# Patient Record
Sex: MALE | Race: WHITE | NOT HISPANIC OR LATINO
[De-identification: names, ages, dates, MRNs, and addresses within clinical notes are randomized per-mention and may not be internally consistent; named-entity substitution may affect disease eponyms.]

---

## 2017-02-16 ENCOUNTER — APPOINTMENT (OUTPATIENT)
Dept: PULMONOLOGY | Facility: CLINIC | Age: 72
End: 2017-02-16

## 2017-02-16 VITALS — BODY MASS INDEX: 29.85 KG/M2 | WEIGHT: 208 LBS

## 2017-02-16 VITALS — BODY MASS INDEX: 29.85 KG/M2 | SYSTOLIC BLOOD PRESSURE: 120 MMHG | DIASTOLIC BLOOD PRESSURE: 70 MMHG | HEIGHT: 70 IN

## 2017-02-16 VITALS — OXYGEN SATURATION: 88 %

## 2017-02-16 DIAGNOSIS — R09.02 HYPOXEMIA: ICD-10-CM

## 2017-02-16 DIAGNOSIS — J43.9 EMPHYSEMA, UNSPECIFIED: ICD-10-CM

## 2017-02-16 DIAGNOSIS — R06.09 OTHER FORMS OF DYSPNEA: ICD-10-CM

## 2017-03-15 ENCOUNTER — INPATIENT (INPATIENT)
Facility: HOSPITAL | Age: 72
LOS: 7 days | DRG: 870 | End: 2017-03-23
Attending: EMERGENCY MEDICINE | Admitting: FAMILY MEDICINE
Payer: COMMERCIAL

## 2017-03-15 VITALS
RESPIRATION RATE: 26 BRPM | HEART RATE: 75 BPM | TEMPERATURE: 101 F | SYSTOLIC BLOOD PRESSURE: 152 MMHG | DIASTOLIC BLOOD PRESSURE: 87 MMHG | WEIGHT: 220.02 LBS | HEIGHT: 71 IN | OXYGEN SATURATION: 98 %

## 2017-03-15 DIAGNOSIS — J44.1 CHRONIC OBSTRUCTIVE PULMONARY DISEASE WITH (ACUTE) EXACERBATION: ICD-10-CM

## 2017-03-15 DIAGNOSIS — N40.0 BENIGN PROSTATIC HYPERPLASIA WITHOUT LOWER URINARY TRACT SYMPTOMS: ICD-10-CM

## 2017-03-15 DIAGNOSIS — K21.9 GASTRO-ESOPHAGEAL REFLUX DISEASE WITHOUT ESOPHAGITIS: ICD-10-CM

## 2017-03-15 DIAGNOSIS — J18.9 PNEUMONIA, UNSPECIFIED ORGANISM: ICD-10-CM

## 2017-03-15 DIAGNOSIS — I10 ESSENTIAL (PRIMARY) HYPERTENSION: ICD-10-CM

## 2017-03-15 DIAGNOSIS — J44.9 CHRONIC OBSTRUCTIVE PULMONARY DISEASE, UNSPECIFIED: ICD-10-CM

## 2017-03-15 LAB
ALBUMIN SERPL ELPH-MCNC: 4.4 G/DL — SIGNIFICANT CHANGE UP (ref 3.3–5.2)
ALP SERPL-CCNC: 66 U/L — SIGNIFICANT CHANGE UP (ref 40–120)
ALT FLD-CCNC: 35 U/L — SIGNIFICANT CHANGE UP
ANION GAP SERPL CALC-SCNC: 12 MMOL/L — SIGNIFICANT CHANGE UP (ref 5–17)
AST SERPL-CCNC: 34 U/L — SIGNIFICANT CHANGE UP
BASE EXCESS BLDA CALC-SCNC: 5.2 MMOL/L — HIGH (ref -3–3)
BASOPHILS # BLD AUTO: 0 K/UL — SIGNIFICANT CHANGE UP (ref 0–0.2)
BASOPHILS NFR BLD AUTO: 0.2 % — SIGNIFICANT CHANGE UP (ref 0–2)
BILIRUB SERPL-MCNC: 0.3 MG/DL — LOW (ref 0.4–2)
BLOOD GAS COMMENTS ARTERIAL: SIGNIFICANT CHANGE UP
BUN SERPL-MCNC: 18 MG/DL — SIGNIFICANT CHANGE UP (ref 8–20)
CALCIUM SERPL-MCNC: 9.2 MG/DL — SIGNIFICANT CHANGE UP (ref 8.6–10.2)
CHLORIDE SERPL-SCNC: 89 MMOL/L — LOW (ref 98–107)
CO2 SERPL-SCNC: 35 MMOL/L — HIGH (ref 22–29)
CREAT SERPL-MCNC: 0.73 MG/DL — SIGNIFICANT CHANGE UP (ref 0.5–1.3)
D DIMER BLD IA.RAPID-MCNC: <150 NG/ML DDU — SIGNIFICANT CHANGE UP
EOSINOPHIL # BLD AUTO: 0 K/UL — SIGNIFICANT CHANGE UP (ref 0–0.5)
EOSINOPHIL NFR BLD AUTO: 0.4 % — SIGNIFICANT CHANGE UP (ref 0–5)
GAS PNL BLDA: SIGNIFICANT CHANGE UP
GLUCOSE SERPL-MCNC: 123 MG/DL — HIGH (ref 70–115)
HCO3 BLDA-SCNC: 29 MMOL/L — HIGH (ref 20–26)
HCT VFR BLD CALC: 46.2 % — SIGNIFICANT CHANGE UP (ref 42–52)
HGB BLD-MCNC: 15 G/DL — SIGNIFICANT CHANGE UP (ref 14–18)
HMPV RNA SPEC QL NAA+PROBE: DETECTED
HOROWITZ INDEX BLDA+IHG-RTO: 50 — SIGNIFICANT CHANGE UP
LYMPHOCYTES # BLD AUTO: 1.2 K/UL — SIGNIFICANT CHANGE UP (ref 1–4.8)
LYMPHOCYTES # BLD AUTO: 13.5 % — LOW (ref 20–55)
MCHC RBC-ENTMCNC: 32.5 G/DL — SIGNIFICANT CHANGE UP (ref 32–36)
MCHC RBC-ENTMCNC: 32.8 PG — HIGH (ref 27–31)
MCV RBC AUTO: 100.9 FL — HIGH (ref 80–94)
MONOCYTES # BLD AUTO: 0.5 K/UL — SIGNIFICANT CHANGE UP (ref 0–0.8)
MONOCYTES NFR BLD AUTO: 6.2 % — SIGNIFICANT CHANGE UP (ref 3–10)
NEUTROPHILS # BLD AUTO: 6.6 K/UL — SIGNIFICANT CHANGE UP (ref 1.8–8)
NEUTROPHILS NFR BLD AUTO: 78.3 % — HIGH (ref 37–73)
NT-PROBNP SERPL-SCNC: 128 PG/ML — SIGNIFICANT CHANGE UP (ref 0–300)
PCO2 BLDA: 62 MMHG — HIGH (ref 35–45)
PH BLDA: 7.34 — LOW (ref 7.35–7.45)
PLATELET # BLD AUTO: 185 K/UL — SIGNIFICANT CHANGE UP (ref 150–400)
PO2 BLDA: 134 MMHG — HIGH (ref 83–108)
POTASSIUM SERPL-MCNC: 4.2 MMOL/L — SIGNIFICANT CHANGE UP (ref 3.5–5.3)
POTASSIUM SERPL-SCNC: 4.2 MMOL/L — SIGNIFICANT CHANGE UP (ref 3.5–5.3)
PROT SERPL-MCNC: 7.9 G/DL — SIGNIFICANT CHANGE UP (ref 6.6–8.7)
RAPID RVP RESULT: DETECTED
RBC # BLD: 4.58 M/UL — LOW (ref 4.6–6.2)
RBC # FLD: 13.4 % — SIGNIFICANT CHANGE UP (ref 11–15.6)
SAO2 % BLDA: 99 % — SIGNIFICANT CHANGE UP (ref 95–99)
SODIUM SERPL-SCNC: 136 MMOL/L — SIGNIFICANT CHANGE UP (ref 135–145)
WBC # BLD: 8.5 K/UL — SIGNIFICANT CHANGE UP (ref 4.8–10.8)
WBC # FLD AUTO: 8.5 K/UL — SIGNIFICANT CHANGE UP (ref 4.8–10.8)

## 2017-03-15 PROCEDURE — 99221 1ST HOSP IP/OBS SF/LOW 40: CPT

## 2017-03-15 PROCEDURE — 99291 CRITICAL CARE FIRST HOUR: CPT

## 2017-03-15 PROCEDURE — 93010 ELECTROCARDIOGRAM REPORT: CPT

## 2017-03-15 PROCEDURE — 99292 CRITICAL CARE ADDL 30 MIN: CPT

## 2017-03-15 RX ORDER — TAMSULOSIN HYDROCHLORIDE 0.4 MG/1
0.4 CAPSULE ORAL AT BEDTIME
Qty: 0 | Refills: 0 | Status: DISCONTINUED | OUTPATIENT
Start: 2017-03-15 | End: 2017-03-23

## 2017-03-15 RX ORDER — BUDESONIDE AND FORMOTEROL FUMARATE DIHYDRATE 160; 4.5 UG/1; UG/1
1 AEROSOL RESPIRATORY (INHALATION)
Qty: 0 | Refills: 0 | Status: DISCONTINUED | OUTPATIENT
Start: 2017-03-15 | End: 2017-03-15

## 2017-03-15 RX ORDER — VALSARTAN 80 MG/1
160 TABLET ORAL DAILY
Qty: 0 | Refills: 0 | Status: DISCONTINUED | OUTPATIENT
Start: 2017-03-15 | End: 2017-03-16

## 2017-03-15 RX ORDER — MAGNESIUM SULFATE 500 MG/ML
1 VIAL (ML) INJECTION ONCE
Qty: 0 | Refills: 0 | Status: COMPLETED | OUTPATIENT
Start: 2017-03-15 | End: 2017-03-15

## 2017-03-15 RX ORDER — IPRATROPIUM/ALBUTEROL SULFATE 18-103MCG
3 AEROSOL WITH ADAPTER (GRAM) INHALATION EVERY 6 HOURS
Qty: 0 | Refills: 0 | Status: DISCONTINUED | OUTPATIENT
Start: 2017-03-15 | End: 2017-03-15

## 2017-03-15 RX ORDER — SODIUM CHLORIDE 9 MG/ML
3 INJECTION INTRAMUSCULAR; INTRAVENOUS; SUBCUTANEOUS ONCE
Qty: 0 | Refills: 0 | Status: COMPLETED | OUTPATIENT
Start: 2017-03-15 | End: 2017-03-15

## 2017-03-15 RX ORDER — ALBUTEROL 90 UG/1
2.5 AEROSOL, METERED ORAL EVERY 4 HOURS
Qty: 0 | Refills: 0 | Status: DISCONTINUED | OUTPATIENT
Start: 2017-03-15 | End: 2017-03-23

## 2017-03-15 RX ORDER — ALBUTEROL 90 UG/1
1 AEROSOL, METERED ORAL EVERY 4 HOURS
Qty: 0 | Refills: 0 | Status: DISCONTINUED | OUTPATIENT
Start: 2017-03-15 | End: 2017-03-15

## 2017-03-15 RX ORDER — FINASTERIDE 5 MG/1
5 TABLET, FILM COATED ORAL DAILY
Qty: 0 | Refills: 0 | Status: DISCONTINUED | OUTPATIENT
Start: 2017-03-15 | End: 2017-03-16

## 2017-03-15 RX ORDER — IPRATROPIUM/ALBUTEROL SULFATE 18-103MCG
3 AEROSOL WITH ADAPTER (GRAM) INHALATION EVERY 4 HOURS
Qty: 0 | Refills: 0 | Status: DISCONTINUED | OUTPATIENT
Start: 2017-03-15 | End: 2017-03-23

## 2017-03-15 RX ORDER — ACETAMINOPHEN 500 MG
1000 TABLET ORAL ONCE
Qty: 0 | Refills: 0 | Status: COMPLETED | OUTPATIENT
Start: 2017-03-15 | End: 2017-03-15

## 2017-03-15 RX ORDER — METOPROLOL TARTRATE 50 MG
100 TABLET ORAL DAILY
Qty: 0 | Refills: 0 | Status: DISCONTINUED | OUTPATIENT
Start: 2017-03-15 | End: 2017-03-16

## 2017-03-15 RX ORDER — PANTOPRAZOLE SODIUM 20 MG/1
40 TABLET, DELAYED RELEASE ORAL
Qty: 0 | Refills: 0 | Status: DISCONTINUED | OUTPATIENT
Start: 2017-03-15 | End: 2017-03-16

## 2017-03-15 RX ORDER — TIOTROPIUM BROMIDE 18 UG/1
1 CAPSULE ORAL; RESPIRATORY (INHALATION) DAILY
Qty: 0 | Refills: 0 | Status: DISCONTINUED | OUTPATIENT
Start: 2017-03-15 | End: 2017-03-15

## 2017-03-15 RX ORDER — ENOXAPARIN SODIUM 100 MG/ML
40 INJECTION SUBCUTANEOUS EVERY 24 HOURS
Qty: 0 | Refills: 0 | Status: DISCONTINUED | OUTPATIENT
Start: 2017-03-15 | End: 2017-03-23

## 2017-03-15 RX ORDER — PIPERACILLIN AND TAZOBACTAM 4; .5 G/20ML; G/20ML
3.38 INJECTION, POWDER, LYOPHILIZED, FOR SOLUTION INTRAVENOUS ONCE
Qty: 0 | Refills: 0 | Status: COMPLETED | OUTPATIENT
Start: 2017-03-15 | End: 2017-03-15

## 2017-03-15 RX ADMIN — SODIUM CHLORIDE 3 MILLILITER(S): 9 INJECTION INTRAMUSCULAR; INTRAVENOUS; SUBCUTANEOUS at 14:20

## 2017-03-15 RX ADMIN — Medication 100 GRAM(S): at 15:16

## 2017-03-15 RX ADMIN — PIPERACILLIN AND TAZOBACTAM 200 GRAM(S): 4; .5 INJECTION, POWDER, LYOPHILIZED, FOR SOLUTION INTRAVENOUS at 15:16

## 2017-03-15 RX ADMIN — Medication 1000 MILLIGRAM(S): at 15:30

## 2017-03-15 RX ADMIN — ENOXAPARIN SODIUM 40 MILLIGRAM(S): 100 INJECTION SUBCUTANEOUS at 22:23

## 2017-03-15 RX ADMIN — TAMSULOSIN HYDROCHLORIDE 0.4 MILLIGRAM(S): 0.4 CAPSULE ORAL at 22:23

## 2017-03-15 RX ADMIN — Medication 400 MILLIGRAM(S): at 15:16

## 2017-03-15 RX ADMIN — Medication 40 MILLIGRAM(S): at 22:22

## 2017-03-15 RX ADMIN — Medication 3 MILLILITER(S): at 20:00

## 2017-03-15 RX ADMIN — Medication 3 MILLILITER(S): at 14:00

## 2017-03-15 RX ADMIN — Medication 125 MILLIGRAM(S): at 14:20

## 2017-03-15 NOTE — ED ADULT NURSE REASSESSMENT NOTE - NS ED NURSE REASSESS COMMENT FT1
Spoke to MD Dominguez. Ok to trail pt off BiPap. Pt AOx3, resp even unlabored, no complaints at this time. will continue to monitor

## 2017-03-15 NOTE — ED PROVIDER NOTE - PMH
Anxiety disorder    BPH (Benign Prostatic Hyperplasia)    COPD (chronic obstructive pulmonary disease)    Gastric reflux    Gastric ulcer    HTN (hypertension)

## 2017-03-15 NOTE — ED ADULT NURSE NOTE - OBJECTIVE STATEMENT
72y/o male c/o diff breathing. Pt AOx3, resp labored, bilateral wheezing, use of accessory muscles noted. Pt denies chest pain, numbness or tingling. Pt cough present upon arrival, febrile, sinus tach 145HR. Dr. Ruiz at bedside, respiratory called to bedside. Pt placed on BiPap, will continue to monitor

## 2017-03-15 NOTE — H&P ADULT - NSHPPHYSICALEXAM_GEN_ALL_CORE
PHYSICAL EXAM:  Vital Signs Last 24 Hrs  T(C): 38.1, Max: 38.1 (03-15 @ 13:38)  T(F): 100.5, Max: 100.5 (03-15 @ 13:38)  HR: 117 (75 - 145)  BP: 140/70 (140/70 - 152/87)  BP(mean): --  RR: 26 (26 - 26)  SpO2: 96% (96% - 100%)  GENERAL: NAD, well-groomed, well-developed  HEAD:  Atraumatic, Normocephalic  EYES: EOMI, PERRLA, conjunctiva and sclera clear  ENMT: No tonsillar erythema, exudates, or enlargement; Moist mucous membranes, Good dentition, No lesions  NECK: Supple, No JVD, Normal thyroid  NERVOUS SYSTEM:  Alert & Oriented X3, Good concentration; Motor Strength 5/5 B/L upper and lower extremities; DTRs 2+ intact and symmetric  CHEST/LUNG:  rales + b/l lower lungs, fair air entry b/l   HEART: Regular rate and rhythm; No murmurs, rubs, or gallops  ABDOMEN: Soft, Nontender, Nondistended; Bowel sounds present  EXTREMITIES:  2+ Peripheral Pulses, No clubbing, cyanosis, or edema  LYMPH: No lymphadenopathy noted  SKIN: No rashes or lesions

## 2017-03-15 NOTE — ED ADULT NURSE REASSESSMENT NOTE - NS ED NURSE REASSESS COMMENT FT1
Pt AOx3, resp even, unlabored. Tolerating Bipap well. Pt O2Sat 98%. Pt remains sinus tach 132HR.  right sided arm infiltrate noted, MD Lombardi aware. will continue to monitor.

## 2017-03-15 NOTE — ED PROVIDER NOTE - CRITICAL CARE PROVIDED
documentation/consultation with other physicians/interpretation of diagnostic studies/additional history taking/direct patient care (not related to procedure)

## 2017-03-15 NOTE — H&P ADULT - ASSESSMENT
71 years male with PMH of HTN,BPH, COPD on home oxygen 4 Liters presented to ED with difficulty breathing, cough and fever.

## 2017-03-15 NOTE — ED ADULT NURSE REASSESSMENT NOTE - NS ED NURSE REASSESS COMMENT FT1
Pt tolerated 4LNC for 30mins, Pt became labored, O2Sat 98%, appeared uncomfortable. MD Lombardi made aware, Pt return to BiPap. care continued by night RN.

## 2017-03-15 NOTE — H&P ADULT - PROBLEM SELECTOR PLAN 4
Pt is on BIPAP 40%- O2 sat 96%, plan to wean on BIPAP, C/W methyprednisone and inhalors, Called Dr [facility],

## 2017-03-15 NOTE — ED ADULT NURSE REASSESSMENT NOTE - NS ED NURSE REASSESS COMMENT FT1
Assumed patient care from LISANDRO Huitron at 2330,. charting as noted. Patient received in B15R, lying in bed. A&ox4, able to make needs known. On Bipap, denies pain but states discomfort in breathing. Lung fields diminished upon auscultation, repositioned for comfot on  Left ac iv site without redness or swelling, Assumed patient care from LISANDRO Huitron at 2330,. charting as noted. Patient received in B15R, lying in bed. A&ox4, able to make needs known. On Bipap, denies pain but states discomfort in breathing, utilizing accessory muscles. Lung fields diminished upon auscultation, repositioned for comfort. Left ac iv site without redness or swelling, Patient not in distress.

## 2017-03-15 NOTE — ED PROVIDER NOTE - CARE PLAN
Principal Discharge DX:	COPD exacerbation  Secondary Diagnosis:	Pneumonia  Secondary Diagnosis:	HTN (hypertension)

## 2017-03-15 NOTE — H&P ADULT - HISTORY OF PRESENT ILLNESS
71 years male with PMH of HTN,BPH, COPD on home oxygen 4 Liters presented to ED with difficulty breathing, cough and fever. Pt denies chest pain, back pain, N/V/D.  Pt denies any abdominal pain, urinary symptoms, back pain headaches or any other symptoms.

## 2017-03-15 NOTE — CONSULT NOTE ADULT - SUBJECTIVE AND OBJECTIVE BOX
PULMONARY CONSULT NOTE      LELA JOSHUA-[id number]    Patient is a 71y old  Male who presents with a chief complaint of severe dyspnea and progressive edema  Severe NIALL on Symbicort and Tudorza.  Not 02 requiring.  NIALL suspect    HISTORY OF PRESENT ILLNESS:    MEDICATIONS  (STANDING):  ALBUTerol/ipratropium for Nebulization 3milliLiter(s) Nebulizer every 6 hours  ALBUTerol    90 MICROgram(s) HFA Inhaler 1Puff(s) Inhalation every 4 hours  tiotropium 18 MICROgram(s) Capsule 1Capsule(s) Inhalation daily  finasteride 5milliGRAM(s) Oral daily  hydrochlorothiazide    Capsule 12.5milliGRAM(s) Oral daily  valsartan 160milliGRAM(s) Oral daily  metoprolol 100milliGRAM(s) Oral daily  pantoprazole    Tablet 40milliGRAM(s) Oral before breakfast  buDESOnide 160 MICROgram(s)/formoterol 4.5 MICROgram(s) Inhaler 1Puff(s) Inhalation two times a day  tamsulosin 0.4milliGRAM(s) Oral at bedtime  levoFLOXacin IVPB 750milliGRAM(s) IV Intermittent every 24 hours  enoxaparin Injectable 40milliGRAM(s) SubCutaneous every 24 hours  methylPREDNISolone sodium succinate Injectable 40milliGRAM(s) IV Push every 8 hours      MEDICATIONS  (PRN):      Allergies    No Known Allergies    Intolerances        PAST MEDICAL & SURGICAL HISTORY:  Gastric ulcer  BPH (Benign Prostatic Hyperplasia)  Gastric reflux  COPD (chronic obstructive pulmonary disease)  Anxiety disorder  HTN (hypertension)  S/P colonoscopy      FAMILY HISTORY:  No pertinent family history in first degree relatives      SOCIAL HISTORY  Smoking History: Former smoker    REVIEW OF SYSTEMS:    CONSTITUTIONAL:  No fevers, chills, sweats    HEENT:  Eyes:  No diplopia or blurred vision. ENT:  No earache, sore throat or runny nose.    CARDIOVASCULAR:  No pressure, squeezing, tightness, or heaviness about the chest; no palpitations.    RESPIRATORY:  Severe dyspnea    GASTROINTESTINAL:  No abdominal pain, nausea, vomiting or diarrhea.    GENITOURINARY:  No dysuria, frequency or urgency.    NEUROLOGIC:  No paresthesias, fasciculations, seizures or weakness.    PSYCHIATRIC:  No disorder of thought or mood.    Vital Signs Last 24 Hrs  T(C): 38.1, Max: 38.1 (03-15 @ 13:38)  T(F): 100.5, Max: 100.5 (03-15 @ 13:38)  HR: 140 (75 - 145)  BP: 140/70 (140/70 - 152/87)  BP(mean): --  RR: 26 (26 - 26)  SpO2: 99% (98% - 100%)    PHYSICAL EXAMINATION:    GENERAL: The patient is a well-developed, well-nourished _____in no apparent distress.     HEENT: Head is normocephalic and atraumatic. Extraocular muscles are intact. Mucous membranes are moist.     NECK: Supple.     LUNGS: Diminished to auscultation without wheezing, rales, or rhonchi. Respirations unlabored    HEART: Regular rate and rhythm without murmur.    ABDOMEN: Soft, nontender, and nondistended.  No hepatosplenomegaly is noted.    EXTREMITIES: Without any cyanosis, clubbing, rash, lesions.  Pitting leg edema.    NEUROLOGIC: Grossly intact.      LABS:                        15.0   8.5   )-----------( 185      ( 15 Mar 2017 14:00 )             46.2     15 Mar 2017 14:00    136    |  89     |  18.0   ----------------------------<  123    4.2     |  35.0   |  0.73     Ca    9.2        15 Mar 2017 14:00    TPro  7.9    /  Alb  4.4    /  TBili  0.3    /  DBili  x      /  AST  34     /  ALT  35     /  AlkPhos  66     15 Mar 2017 14:00          CARDIAC MARKERS ( 15 Mar 2017 14:00 )  x     / <0.01 ng/mL / x     / x     / x          D-Dimer Assay, Quantitative: <150 ng/mL DDU (03-15 @ 13:59)    Serum Pro-Brain Natriuretic Peptide: 128 pg/mL (03-15 @ 13:59)          MICROBIOLOGY:    RADIOLOGY & ADDITIONAL STUDIES:      EXAM:  CHEST 2 VIEWS PA AND LAT      PROCEDURE DATE:  Mar 24 2016     INTERPRETATION:  Clinical information: COPD and recent pneumonia.    Findings: PA and lateral views of the chest dated 3/24/2016 are compared   to chest radiograph dated 5/31/2013 and CT chest dated 5/20/2014.   Hyperinflation and bullous emphysematous changes are again noted. There   is no pulmonary consolidation or pleural effusion. Cardiac, mediastinal   and hilar structures are within normal limits. Visualized osseous   structures demonstrate no abnormality.    IMPRESSION: No consolidation.    JENNIFER RYDER M.D., ATTENDING RADIOLOGIST  This document has been electronically signed. Mar 24 2016  4:40P PULMONARY CONSULT NOTE      LELA JOSHUA-[id number]    Patient is a 71y old  Male who presents with a chief complaint of severe dyspnea and progressive edema  Severe COPD on Symbicort and Tudorza.  Not 02 requiring.  NIALL suspect    HISTORY OF PRESENT ILLNESS:    MEDICATIONS  (STANDING):  ALBUTerol/ipratropium for Nebulization 3milliLiter(s) Nebulizer every 6 hours  ALBUTerol    90 MICROgram(s) HFA Inhaler 1Puff(s) Inhalation every 4 hours  tiotropium 18 MICROgram(s) Capsule 1Capsule(s) Inhalation daily  finasteride 5milliGRAM(s) Oral daily  hydrochlorothiazide    Capsule 12.5milliGRAM(s) Oral daily  valsartan 160milliGRAM(s) Oral daily  metoprolol 100milliGRAM(s) Oral daily  pantoprazole    Tablet 40milliGRAM(s) Oral before breakfast  buDESOnide 160 MICROgram(s)/formoterol 4.5 MICROgram(s) Inhaler 1Puff(s) Inhalation two times a day  tamsulosin 0.4milliGRAM(s) Oral at bedtime  levoFLOXacin IVPB 750milliGRAM(s) IV Intermittent every 24 hours  enoxaparin Injectable 40milliGRAM(s) SubCutaneous every 24 hours  methylPREDNISolone sodium succinate Injectable 40milliGRAM(s) IV Push every 8 hours      MEDICATIONS  (PRN):      Allergies    No Known Allergies    Intolerances        PAST MEDICAL & SURGICAL HISTORY:  Gastric ulcer  BPH (Benign Prostatic Hyperplasia)  Gastric reflux  COPD (chronic obstructive pulmonary disease)  Anxiety disorder  HTN (hypertension)  S/P colonoscopy      FAMILY HISTORY:  No pertinent family history in first degree relatives      SOCIAL HISTORY  Smoking History: Former smoker    REVIEW OF SYSTEMS:    CONSTITUTIONAL:  No fevers, chills, sweats    HEENT:  Eyes:  No diplopia or blurred vision. ENT:  No earache, sore throat or runny nose.    CARDIOVASCULAR:  No pressure, squeezing, tightness, or heaviness about the chest; no palpitations.    RESPIRATORY:  Severe dyspnea    GASTROINTESTINAL:  No abdominal pain, nausea, vomiting or diarrhea.    GENITOURINARY:  No dysuria, frequency or urgency.    NEUROLOGIC:  No paresthesias, fasciculations, seizures or weakness.    PSYCHIATRIC:  No disorder of thought or mood.    Vital Signs Last 24 Hrs  T(C): 38.1, Max: 38.1 (03-15 @ 13:38)  T(F): 100.5, Max: 100.5 (03-15 @ 13:38)  HR: 140 (75 - 145)  BP: 140/70 (140/70 - 152/87)  BP(mean): --  RR: 26 (26 - 26)  SpO2: 99% (98% - 100%)    PHYSICAL EXAMINATION:    GENERAL: The patient is a well-developed, well-nourished _____in no apparent distress.     HEENT: Head is normocephalic and atraumatic. Extraocular muscles are intact. Mucous membranes are moist.     NECK: Supple.     LUNGS: Diminished to auscultation without wheezing, rales, or rhonchi. Respirations unlabored    HEART: Regular rate and rhythm without murmur.    ABDOMEN: Soft, nontender, and nondistended.  No hepatosplenomegaly is noted.    EXTREMITIES: Without any cyanosis, clubbing, rash, lesions.  Pitting leg edema.    NEUROLOGIC: Grossly intact.      LABS:                        15.0   8.5   )-----------( 185      ( 15 Mar 2017 14:00 )             46.2     15 Mar 2017 14:00    136    |  89     |  18.0   ----------------------------<  123    4.2     |  35.0   |  0.73     Ca    9.2        15 Mar 2017 14:00    TPro  7.9    /  Alb  4.4    /  TBili  0.3    /  DBili  x      /  AST  34     /  ALT  35     /  AlkPhos  66     15 Mar 2017 14:00          CARDIAC MARKERS ( 15 Mar 2017 14:00 )  x     / <0.01 ng/mL / x     / x     / x          D-Dimer Assay, Quantitative: <150 ng/mL DDU (03-15 @ 13:59)    Serum Pro-Brain Natriuretic Peptide: 128 pg/mL (03-15 @ 13:59)          MICROBIOLOGY:    RADIOLOGY & ADDITIONAL STUDIES:      EXAM:  CHEST 2 VIEWS PA AND LAT      PROCEDURE DATE:  Mar 24 2016     INTERPRETATION:  Clinical information: COPD and recent pneumonia.    Findings: PA and lateral views of the chest dated 3/24/2016 are compared   to chest radiograph dated 5/31/2013 and CT chest dated 5/20/2014.   Hyperinflation and bullous emphysematous changes are again noted. There   is no pulmonary consolidation or pleural effusion. Cardiac, mediastinal   and hilar structures are within normal limits. Visualized osseous   structures demonstrate no abnormality.    IMPRESSION: No consolidation.    JENNIFER RYDER M.D., ATTENDING RADIOLOGIST  This document has been electronically signed. Mar 24 2016  4:40P

## 2017-03-15 NOTE — ED PROVIDER NOTE - OBJECTIVE STATEMENT
The patient is a 71 year old male presents to ED with SOB, cough and fever.  The patient has history of COPD and HTN.  No CP, NO abd pain, No recent surgery no recent trauma,no travel.

## 2017-03-16 DIAGNOSIS — R57.9 SHOCK, UNSPECIFIED: ICD-10-CM

## 2017-03-16 DIAGNOSIS — J12.3 HUMAN METAPNEUMOVIRUS PNEUMONIA: ICD-10-CM

## 2017-03-16 DIAGNOSIS — J44.1 CHRONIC OBSTRUCTIVE PULMONARY DISEASE WITH (ACUTE) EXACERBATION: ICD-10-CM

## 2017-03-16 DIAGNOSIS — J96.01 ACUTE RESPIRATORY FAILURE WITH HYPOXIA: ICD-10-CM

## 2017-03-16 LAB
ALBUMIN SERPL ELPH-MCNC: 3.9 G/DL — SIGNIFICANT CHANGE UP (ref 3.3–5.2)
ALP SERPL-CCNC: 60 U/L — SIGNIFICANT CHANGE UP (ref 40–120)
ALT FLD-CCNC: 36 U/L — SIGNIFICANT CHANGE UP
ANION GAP SERPL CALC-SCNC: 13 MMOL/L — SIGNIFICANT CHANGE UP (ref 5–17)
ANION GAP SERPL CALC-SCNC: 13 MMOL/L — SIGNIFICANT CHANGE UP (ref 5–17)
APPEARANCE UR: CLEAR — SIGNIFICANT CHANGE UP
AST SERPL-CCNC: 57 U/L — HIGH
BACTERIA # UR AUTO: ABNORMAL
BASE EXCESS BLDA CALC-SCNC: 4.4 MMOL/L — HIGH (ref -3–3)
BILIRUB SERPL-MCNC: 0.3 MG/DL — LOW (ref 0.4–2)
BILIRUB UR-MCNC: NEGATIVE — SIGNIFICANT CHANGE UP
BLOOD GAS COMMENTS ARTERIAL: SIGNIFICANT CHANGE UP
BUN SERPL-MCNC: 27 MG/DL — HIGH (ref 8–20)
BUN SERPL-MCNC: 40 MG/DL — HIGH (ref 8–20)
CALCIUM SERPL-MCNC: 8.1 MG/DL — LOW (ref 8.6–10.2)
CALCIUM SERPL-MCNC: 8.7 MG/DL — SIGNIFICANT CHANGE UP (ref 8.6–10.2)
CHLORIDE SERPL-SCNC: 91 MMOL/L — LOW (ref 98–107)
CHLORIDE SERPL-SCNC: 92 MMOL/L — LOW (ref 98–107)
CO2 SERPL-SCNC: 30 MMOL/L — HIGH (ref 22–29)
CO2 SERPL-SCNC: 33 MMOL/L — HIGH (ref 22–29)
COLOR SPEC: YELLOW — SIGNIFICANT CHANGE UP
CREAT SERPL-MCNC: 0.59 MG/DL — SIGNIFICANT CHANGE UP (ref 0.5–1.3)
CREAT SERPL-MCNC: 1 MG/DL — SIGNIFICANT CHANGE UP (ref 0.5–1.3)
DIFF PNL FLD: ABNORMAL
EPI CELLS # UR: SIGNIFICANT CHANGE UP
GAS PNL BLDA: SIGNIFICANT CHANGE UP
GLUCOSE SERPL-MCNC: 143 MG/DL — HIGH (ref 70–115)
GLUCOSE SERPL-MCNC: 193 MG/DL — HIGH (ref 70–115)
GLUCOSE UR QL: NEGATIVE MG/DL — SIGNIFICANT CHANGE UP
HCO3 BLDA-SCNC: 28 MMOL/L — HIGH (ref 20–26)
HCT VFR BLD CALC: 42.3 % — SIGNIFICANT CHANGE UP (ref 42–52)
HCT VFR BLD CALC: 42.5 % — SIGNIFICANT CHANGE UP (ref 42–52)
HGB BLD-MCNC: 13.5 G/DL — LOW (ref 14–18)
HGB BLD-MCNC: 13.9 G/DL — LOW (ref 14–18)
HOROWITZ INDEX BLDA+IHG-RTO: 35 — SIGNIFICANT CHANGE UP
HYALINE CASTS # UR AUTO: ABNORMAL /LPF
KETONES UR-MCNC: ABNORMAL
LACTATE BLDV-MCNC: 1.8 MMOL/L — SIGNIFICANT CHANGE UP (ref 0.7–2)
LEUKOCYTE ESTERASE UR-ACNC: NEGATIVE — SIGNIFICANT CHANGE UP
MAGNESIUM SERPL-MCNC: 2.4 MG/DL — SIGNIFICANT CHANGE UP (ref 1.8–2.5)
MAGNESIUM SERPL-MCNC: 2.5 MG/DL — SIGNIFICANT CHANGE UP (ref 1.8–2.5)
MCHC RBC-ENTMCNC: 31.9 G/DL — LOW (ref 32–36)
MCHC RBC-ENTMCNC: 32.4 PG — HIGH (ref 27–31)
MCHC RBC-ENTMCNC: 32.5 PG — HIGH (ref 27–31)
MCHC RBC-ENTMCNC: 32.7 G/DL — SIGNIFICANT CHANGE UP (ref 32–36)
MCV RBC AUTO: 101.9 FL — HIGH (ref 80–94)
MCV RBC AUTO: 99.1 FL — HIGH (ref 80–94)
NITRITE UR-MCNC: NEGATIVE — SIGNIFICANT CHANGE UP
PCO2 BLDA: 56 MMHG — HIGH (ref 35–45)
PH BLDA: 7.36 — SIGNIFICANT CHANGE UP (ref 7.35–7.45)
PH UR: 5 — SIGNIFICANT CHANGE UP (ref 4.8–8)
PHOSPHATE SERPL-MCNC: 3.7 MG/DL — SIGNIFICANT CHANGE UP (ref 2.4–4.7)
PHOSPHATE SERPL-MCNC: 3.9 MG/DL — SIGNIFICANT CHANGE UP (ref 2.4–4.7)
PLATELET # BLD AUTO: 194 K/UL — SIGNIFICANT CHANGE UP (ref 150–400)
PLATELET # BLD AUTO: 218 K/UL — SIGNIFICANT CHANGE UP (ref 150–400)
PO2 BLDA: 137 MMHG — HIGH (ref 83–108)
POTASSIUM SERPL-MCNC: 4.5 MMOL/L — SIGNIFICANT CHANGE UP (ref 3.5–5.3)
POTASSIUM SERPL-MCNC: 4.6 MMOL/L — SIGNIFICANT CHANGE UP (ref 3.5–5.3)
POTASSIUM SERPL-SCNC: 4.5 MMOL/L — SIGNIFICANT CHANGE UP (ref 3.5–5.3)
POTASSIUM SERPL-SCNC: 4.6 MMOL/L — SIGNIFICANT CHANGE UP (ref 3.5–5.3)
PROT SERPL-MCNC: 7.3 G/DL — SIGNIFICANT CHANGE UP (ref 6.6–8.7)
PROT UR-MCNC: 30 MG/DL
RBC # BLD: 4.15 M/UL — LOW (ref 4.6–6.2)
RBC # BLD: 4.29 M/UL — LOW (ref 4.6–6.2)
RBC # FLD: 13.3 % — SIGNIFICANT CHANGE UP (ref 11–15.6)
RBC # FLD: 13.3 % — SIGNIFICANT CHANGE UP (ref 11–15.6)
RBC CASTS # UR COMP ASSIST: ABNORMAL /HPF (ref 0–4)
SAO2 % BLDA: 99 % — SIGNIFICANT CHANGE UP (ref 95–99)
SODIUM SERPL-SCNC: 134 MMOL/L — LOW (ref 135–145)
SODIUM SERPL-SCNC: 138 MMOL/L — SIGNIFICANT CHANGE UP (ref 135–145)
SP GR SPEC: 1.03 — HIGH (ref 1.01–1.02)
UROBILINOGEN FLD QL: NEGATIVE MG/DL — SIGNIFICANT CHANGE UP
WBC # BLD: 19 K/UL — HIGH (ref 4.8–10.8)
WBC # BLD: 8.9 K/UL — SIGNIFICANT CHANGE UP (ref 4.8–10.8)
WBC # FLD AUTO: 19 K/UL — HIGH (ref 4.8–10.8)
WBC # FLD AUTO: 8.9 K/UL — SIGNIFICANT CHANGE UP (ref 4.8–10.8)
WBC UR QL: SIGNIFICANT CHANGE UP

## 2017-03-16 PROCEDURE — 71010: CPT | Mod: 26,77

## 2017-03-16 PROCEDURE — 71010: CPT | Mod: 26

## 2017-03-16 PROCEDURE — 99291 CRITICAL CARE FIRST HOUR: CPT

## 2017-03-16 RX ORDER — ALBUTEROL 90 UG/1
15 AEROSOL, METERED ORAL ONCE
Qty: 0 | Refills: 0 | Status: COMPLETED | OUTPATIENT
Start: 2017-03-16 | End: 2017-03-16

## 2017-03-16 RX ORDER — HYDROCORTISONE 20 MG
100 TABLET ORAL EVERY 6 HOURS
Qty: 0 | Refills: 0 | Status: DISCONTINUED | OUTPATIENT
Start: 2017-03-16 | End: 2017-03-17

## 2017-03-16 RX ORDER — PANTOPRAZOLE SODIUM 20 MG/1
40 TABLET, DELAYED RELEASE ORAL DAILY
Qty: 0 | Refills: 0 | Status: DISCONTINUED | OUTPATIENT
Start: 2017-03-16 | End: 2017-03-20

## 2017-03-16 RX ORDER — PHENYLEPHRINE HYDROCHLORIDE 10 MG/ML
0.4 INJECTION INTRAVENOUS
Qty: 80 | Refills: 0 | Status: DISCONTINUED | OUTPATIENT
Start: 2017-03-16 | End: 2017-03-16

## 2017-03-16 RX ORDER — ALPRAZOLAM 0.25 MG
0.25 TABLET ORAL EVERY 8 HOURS
Qty: 0 | Refills: 0 | Status: DISCONTINUED | OUTPATIENT
Start: 2017-03-16 | End: 2017-03-16

## 2017-03-16 RX ORDER — HYDROCORTISONE 20 MG
50 TABLET ORAL EVERY 6 HOURS
Qty: 0 | Refills: 0 | Status: DISCONTINUED | OUTPATIENT
Start: 2017-03-16 | End: 2017-03-16

## 2017-03-16 RX ORDER — ROCURONIUM BROMIDE 10 MG/ML
100 VIAL (ML) INTRAVENOUS ONCE
Qty: 0 | Refills: 0 | Status: COMPLETED | OUTPATIENT
Start: 2017-03-16 | End: 2017-03-16

## 2017-03-16 RX ORDER — NOREPINEPHRINE BITARTRATE/D5W 8 MG/250ML
0.02 PLASTIC BAG, INJECTION (ML) INTRAVENOUS
Qty: 8 | Refills: 0 | Status: DISCONTINUED | OUTPATIENT
Start: 2017-03-16 | End: 2017-03-18

## 2017-03-16 RX ORDER — CHLORHEXIDINE GLUCONATE 213 G/1000ML
15 SOLUTION TOPICAL
Qty: 0 | Refills: 0 | Status: DISCONTINUED | OUTPATIENT
Start: 2017-03-16 | End: 2017-03-23

## 2017-03-16 RX ORDER — ALPRAZOLAM 0.25 MG
0.25 TABLET ORAL ONCE
Qty: 0 | Refills: 0 | Status: DISCONTINUED | OUTPATIENT
Start: 2017-03-16 | End: 2017-03-16

## 2017-03-16 RX ORDER — SODIUM CHLORIDE 9 MG/ML
1000 INJECTION INTRAMUSCULAR; INTRAVENOUS; SUBCUTANEOUS ONCE
Qty: 0 | Refills: 0 | Status: COMPLETED | OUTPATIENT
Start: 2017-03-16 | End: 2017-03-16

## 2017-03-16 RX ORDER — KETAMINE HYDROCHLORIDE 100 MG/ML
150 INJECTION INTRAMUSCULAR; INTRAVENOUS ONCE
Qty: 0 | Refills: 0 | Status: DISCONTINUED | OUTPATIENT
Start: 2017-03-16 | End: 2017-03-16

## 2017-03-16 RX ADMIN — CHLORHEXIDINE GLUCONATE 15 MILLILITER(S): 213 SOLUTION TOPICAL at 18:41

## 2017-03-16 RX ADMIN — Medication 50 MILLIGRAM(S): at 17:48

## 2017-03-16 RX ADMIN — Medication 3 MILLILITER(S): at 00:18

## 2017-03-16 RX ADMIN — Medication 3.74 MICROGRAM(S)/KG/MIN: at 19:33

## 2017-03-16 RX ADMIN — VALSARTAN 160 MILLIGRAM(S): 80 TABLET ORAL at 05:27

## 2017-03-16 RX ADMIN — SODIUM CHLORIDE 2000 MILLILITER(S): 9 INJECTION INTRAMUSCULAR; INTRAVENOUS; SUBCUTANEOUS at 15:00

## 2017-03-16 RX ADMIN — Medication 100 MILLIGRAM(S): at 05:27

## 2017-03-16 RX ADMIN — Medication 3 MILLILITER(S): at 16:00

## 2017-03-16 RX ADMIN — ALBUTEROL 2.5 MILLIGRAM(S): 90 AEROSOL, METERED ORAL at 03:16

## 2017-03-16 RX ADMIN — Medication 40 MILLIGRAM(S): at 13:23

## 2017-03-16 RX ADMIN — Medication 40 MILLIGRAM(S): at 05:26

## 2017-03-16 RX ADMIN — ALBUTEROL 15 MILLIGRAM(S): 90 AEROSOL, METERED ORAL at 10:56

## 2017-03-16 RX ADMIN — Medication 3 MILLILITER(S): at 12:25

## 2017-03-16 RX ADMIN — Medication 3.74 MICROGRAM(S)/KG/MIN: at 16:16

## 2017-03-16 RX ADMIN — Medication 0.25 MILLIGRAM(S): at 04:30

## 2017-03-16 RX ADMIN — ENOXAPARIN SODIUM 40 MILLIGRAM(S): 100 INJECTION SUBCUTANEOUS at 23:41

## 2017-03-16 RX ADMIN — TAMSULOSIN HYDROCHLORIDE 0.4 MILLIGRAM(S): 0.4 CAPSULE ORAL at 23:46

## 2017-03-16 RX ADMIN — Medication 100 MILLIGRAM(S): at 23:45

## 2017-03-16 RX ADMIN — Medication 100 MILLIGRAM(S): at 14:56

## 2017-03-16 RX ADMIN — KETAMINE HYDROCHLORIDE 150 MILLIGRAM(S): 100 INJECTION INTRAMUSCULAR; INTRAVENOUS at 14:55

## 2017-03-16 RX ADMIN — Medication 3 MILLILITER(S): at 08:25

## 2017-03-16 RX ADMIN — ALBUTEROL 2.5 MILLIGRAM(S): 90 AEROSOL, METERED ORAL at 14:42

## 2017-03-16 NOTE — AIRWAY PLACEMENT NOTE ADULT - AIRWAY COMMENTS:
Patient's airway was easily intubated.  Breath sounds were markedly distant and with some moderate noncompliance appreciated on bagging.  Capnography was positive after second breath.  Patien't's blood pressure dropped precipitously; in re-evaluation, the ETT was confirmed to be in the correct position.  SpO2 90s --> blood pressure didn't recover, patient developed PEA for 2 minutes.  s/p 1mg epinephrine IVP; was started on bolus of crystalloid as well as phenylephrine already hanging pre-intubation (not infusing at that point, but was on hold just in case); patient's return of spontaneous circulation after 2-3 minutes with /110; titrated off norepinephrine and phenylephine infusions.  STAT echocardiogram was obtained which, in "wet read" did not reveal significant LV dysfunction or RV dysfunction.  HOWEVER -- patient's flow time curve on the ventilator, along with what we clinically observed, seems to indicate auto-PEEPing which may have precipitated the cardiovascular collapse.  Patient's hemodynamics have now required re-initiation of norepinephrine -- currently at 0.3 mcg/kg/min and downtrending; ETCO2 was as high as 80s (during CPR of note his ETCO2 was in the 20s-30s which may indicate no actual cardiac arrest but hypotension without pulses palpable) --ETCO2 now is in the 60s; starting a continuous albuterol treatment to try to decrease bronchospasm induced autoPEEPing.  OGT placed; will need CVC and arterial catheter placement.  Critical care time excluding procedures 50 minutes.

## 2017-03-16 NOTE — ED ADULT NURSE REASSESSMENT NOTE - NS ED NURSE REASSESS COMMENT FT1
spo2 93 on bipap on this time. pt remains diminished to all lung fields with minor improvement. breathing even and unlabored. at this time. pt still c/o of sob. dr. keene was at [facility] @ [phone]. awaiting abg results for possible upgrade or further intervention.

## 2017-03-16 NOTE — PROGRESS NOTE ADULT - SUBJECTIVE AND OBJECTIVE BOX
PULMONARY PROGRESS NOTE      LELA [id number]    Patient is a 71y old  Male who presents with a chief complaint of SOB    INTERVAL HPI/OVERNIGHT EVENTS:  Has remained on BiPAP.  Feeling better    MEDICATIONS  (STANDING):  finasteride 5milliGRAM(s) Oral daily  hydrochlorothiazide    Capsule 12.5milliGRAM(s) Oral daily  valsartan 160milliGRAM(s) Oral daily  metoprolol 100milliGRAM(s) Oral daily  pantoprazole    Tablet 40milliGRAM(s) Oral before breakfast  tamsulosin 0.4milliGRAM(s) Oral at bedtime  levoFLOXacin IVPB 750milliGRAM(s) IV Intermittent every 24 hours  enoxaparin Injectable 40milliGRAM(s) SubCutaneous every 24 hours  methylPREDNISolone sodium succinate Injectable 40milliGRAM(s) IV Push every 8 hours  ALBUTerol/ipratropium for Nebulization 3milliLiter(s) Nebulizer every 4 hours      MEDICATIONS  (PRN):  ALBUTerol    0.083% 2.5milliGRAM(s) Nebulizer every 4 hours PRN Shortness of Breath and/or Wheezing  ALPRAZolam 0.25milliGRAM(s) Oral every 8 hours PRN Anxiety      Allergies    No Known Allergies    Intolerances        PAST MEDICAL & SURGICAL HISTORY:  Gastric ulcer  BPH (Benign Prostatic Hyperplasia)  Gastric reflux  COPD (chronic obstructive pulmonary disease)  Anxiety disorder  HTN (hypertension)  S/P colonoscopy      SOCIAL HISTORY  Smoking History:       REVIEW OF SYSTEMS:    CONSTITUTIONAL:  No distress    HEENT:  Eyes:  No diplopia or blurred vision. ENT:  No earache, sore throat or runny nose.    CARDIOVASCULAR:  No pressure, squeezing, tightness, heaviness or aching about the chest; no palpitations.    RESPIRATORY:  per HPI    GASTROINTESTINAL:  No nausea, vomiting or diarrhea.    GENITOURINARY:  No dysuria, frequency or urgency.    MUSCULOSKELETAL:  No joint pain    SKIN:  No new lesions.    NEUROLOGIC:  No paresthesias, fasciculations, seizures or weakness.    PSYCHIATRIC:  No disorder of thought or mood.    ENDOCRINE:  No heat or cold intolerance, polyuria or polydipsia.    HEMATOLOGICAL:  No easy bruising or bleeding.     Vital Signs Last 24 Hrs  T(C): 37, Max: 38.1 (03-15 @ 13:38)  T(F): 98.6, Max: 100.5 (03-15 @ 13:38)  HR: 118 (75 - 145)  BP: 128/84 (89/62 - 152/92)  BP(mean): 89 (89 - 89)  RR: 20 (20 - 26)  SpO2: 92% (92% - 100%)    PHYSICAL EXAMINATION:    GENERAL: The patient is awake and alert in no apparent distress.     HEENT: Head is normocephalic and atraumatic. Extraocular muscles are intact. Mucous membranes are moist.    NECK: Supple.    LUNGS: diminished bs, scattered exp wheezes    HEART: Regular rate and rhythm without murmur.    ABDOMEN: Soft, nontender, and nondistended.      EXTREMITIES: Without any cyanosis, clubbing, rash, lesions or edema.    NEUROLOGIC: Grossly intact.    SKIN: No ulceration or induration present.      LABS:                        13.9   8.9   )-----------( 194      ( 16 Mar 2017 06:35 )             42.5     16 Mar 2017 06:37    138    |  92     |  27.0   ----------------------------<  143    4.5     |  33.0   |  0.59     Ca    8.7        16 Mar 2017 06:37  Phos  3.9       16 Mar 2017 06:37  Mg     2.4       16 Mar 2017 06:37    TPro  7.3    /  Alb  3.9    /  TBili  0.3    /  DBili  x      /  AST  57     /  ALT  36     /  AlkPhos  60     16 Mar 2017 06:37        ABG - ( 16 Mar 2017 03:36 )  pH: 7.36  /  pCO2: 56    /  pO2: 137   / HCO3: 28    / Base Excess: 4.4   /  SaO2: 99                CARDIAC MARKERS ( 15 Mar 2017 14:00 )  x     / <0.01 ng/mL / x     / x     / x          D-Dimer Assay, Quantitative: <150 ng/mL DDU (03-15 @ 13:59)    Serum Pro-Brain Natriuretic Peptide: 128 pg/mL (03-15 @ 13:59)          MICROBIOLOGY:Rapid Respiratory Viral Panel (03.15.17 @ 21:37)    Rapid RVP Result: Detected: The [employer] RVP Rapid uses polymerase chain reaction (PCR) and melt  curve analysis to screen for adenovirus; coronavirus HKU1, NL63, 229E,  OC43; human metapneumovirus (hMPV); human enterovirus/rhinovirus  (Entero/RV); influenza A; influenza A/H1;Detected: influenza A/H3; influenza  A/H1-2009; influenza B; parainfluenza viruses 1, 2, 3, 4; respiratory  syncytial virus; Bordetella pertussis; Mycoplasma pneumoniae; and  Chlamydophila pneumoniae.          RADIOLOGY & ADDITIONAL STUDIES:   EXAM:  CHEST SINGLE VIEW FRONTAL                          PROCEDURE DATE:  03/16/2017        INTERPRETATION:  Chest, single portable view    HISTORY: Dyspnea    Comparison: 1/30    IMPRESSION:    Support Devices: None  Heart:  Unremarkable  Mediastinum:  Unremarkable  Lungs/Airways: Hyperinflation likely reflecting emphysema. Reticular   nodular opacities right base, stable. No new findings.  Bones/Soft tissues: Unremarkable                                      MAUREEN WILKERSON M.D., ATTENDING RADIOLOGIST  This document has been electronically signed. Mar 16 2017  9:41AM

## 2017-03-16 NOTE — CONSULT NOTE ADULT - SUBJECTIVE AND OBJECTIVE BOX
Patient is a 71y old  Male who presents with a chief complaint of SOB    BRIEF HOSPITAL COURSE:     PAST MEDICAL & SURGICAL HISTORY: (per chart)  Gastric ulcer  BPH (Benign Prostatic Hyperplasia)  Gastric reflux  COPD (chronic obstructive pulmonary disease)  Anxiety disorder  HTN (hypertension)  S/P colonoscopy    Allergies    No Known Allergies    Intolerances      FAMILY HISTORY:  No pertinent family history in first degree relatives per chart      Review of Systems:  unable, respiratory failure      Medications:  levoFLOXacin IVPB 750milliGRAM(s) IV Intermittent every 24 hours    metoprolol 100milliGRAM(s) Oral daily  tamsulosin 0.4milliGRAM(s) Oral at bedtime  norepinephrine Infusion 0.02MICROgram(s)/kG/Min IV Continuous <Continuous>    ALBUTerol/ipratropium for Nebulization 3milliLiter(s) Nebulizer every 4 hours  ALBUTerol    0.083% 2.5milliGRAM(s) Nebulizer every 4 hours PRN  ALBUTerol    0.083%. 15milliGRAM(s) Nebulizer once    ALPRAZolam 0.25milliGRAM(s) Oral every 8 hours PRN  ketamine Injectable 150milliGRAM(s) IV Push once  rocuronium Injectable 100milliGRAM(s) IV Push once      enoxaparin Injectable 40milliGRAM(s) SubCutaneous every 24 hours    pantoprazole    Tablet 40milliGRAM(s) Oral before breakfast      finasteride 5milliGRAM(s) Oral daily  methylPREDNISolone sodium succinate Injectable 40milliGRAM(s) IV Push every 8 hours  hydrocortisone  Injectable 50milliGRAM(s) IV Push every 6 hours    sodium chloride 0.9% Bolus 1000milliLiter(s) IV Bolus once                ICU Vital Signs Last 24 Hrs  T(C): 36.3, Max: 37 (03-16 @ 02:55)  T(F): 97.4, Max: 98.6 (03-16 @ 02:55)  HR: 100 (100 - 144)  BP: 64/44 (60/42 - 152/92)  BP(mean): 49 (47 - 89)  ABP: --  ABP(mean): --  RR: 13 (13 - 38)  SpO2: 96% (90% - 98%)    Vital Signs Last 24 Hrs  T(C): 36.3, Max: 37 (03-16 @ 02:55)  T(F): 97.4, Max: 98.6 (03-16 @ 02:55)  HR: 100 (100 - 144)  BP: 64/44 (60/42 - 152/92)  BP(mean): 49 (47 - 89)  RR: 13 (13 - 38)  SpO2: 96% (90% - 98%)    ABG - ( 16 Mar 2017 03:36 )  pH: 7.36  /  pCO2: 56    /  pO2: 137   / HCO3: 28    / Base Excess: 4.4   /  SaO2: 99                  I&O's Detail        LABS:                        13.9   8.9   )-----------( 194      ( 16 Mar 2017 06:35 )             42.5     16 Mar 2017 06:37    138    |  92     |  27.0   ----------------------------<  143    4.5     |  33.0   |  0.59     Ca    8.7        16 Mar 2017 06:37  Phos  3.9       16 Mar 2017 06:37  Mg     2.4       16 Mar 2017 06:37    TPro  7.3    /  Alb  3.9    /  TBili  0.3    /  DBili  x      /  AST  57     /  ALT  36     /  AlkPhos  60     16 Mar 2017 06:37      CARDIAC MARKERS ( 15 Mar 2017 14:00 )  x     / <0.01 ng/mL / x     / x     / x                  CULTURES:      Physical Examination:    General: Severe respiratory distress, mottling, somnulent but arousable, using accessory muscles    HEENT: Pupils equal, reactive to light.  Symmetric.    PULM: Wheezing throughout without adequate air movement noted    CVS: tachycardic rate and rhythm, no murmurs, rubs, or gallops    ABD: Soft, distended, nontender, normoactive bowel sounds, no masses    EXT: 2+ edema    SKIN: mottled, cool extremities    NEURO: Somnulent but arousable, not able to communicate effectively but follows simple commands and appears nonfocal.    CRITICAL CARE TIME SPENT: 50

## 2017-03-16 NOTE — PROGRESS NOTE ADULT - ASSESSMENT
71 years male with PMH of HTN,BPH, COPD on home oxygen 4 Liters presented to ED with difficulty breathing, cough and fever. Found to be positive for Human metapneumovirus (hMPV) and in COPD exacerbation. He remains on BiPAP in some distress, tried to wean to VM, but patient did not tolerate. The plan was to continue steroids Duoneb wean BiPAP to VM. He was placed on contact precautions for Human metapneumovirus (hMPV). Patient decompensated in the afternoon requiring ICU consult, intubation, and admission to ICU for further management of care.

## 2017-03-16 NOTE — PROGRESS NOTE ADULT - ATTENDING COMMENTS
pt seen and examined with PA. has abd distention due to constipation and has been on BIPAP continuously since admission. with several failed attempts to wean off. sinus tachycardia due to work of breathing. calling ICU for eval. but meanwhile pt decompensated and was taken to ICU  seen by ICU and accepted.

## 2017-03-16 NOTE — AIRWAY PLACEMENT NOTE ADULT - POST AIRWAY PLACEMENT ASSESSMENT:
positive end tidal CO2 noted/chest excursion noted/breath sounds bilateral/CXR pending/very distant breath sounds

## 2017-03-16 NOTE — PROGRESS NOTE ADULT - SUBJECTIVE AND OBJECTIVE BOX
ZAC JOSHUA Patient is a 71y old  Male who presents with a chief complaint of    HPI:  71 years male with PMH of HTN,BPH, COPD on home oxygen 4 Liters presented to ED with difficulty breathing, cough and fever. Pt denies chest pain, back pain, N/V/D.  Pt denies any abdominal pain, urinary symptoms, back pain headaches or any other symptoms. (15 Mar 2017 19:34)    Interval History 3/16:  The patient was seen and examined earlier in the day before needing to be evaluated by the ICU. Patient was found with BiPAP on, in some respiratory distress. States he is SOB and having some difficulty breathing. He was attempted to be weaned of the BiPAP to venti mask and did not do well. States he has not had a bowel movement in 5 days. He denies fever, chills, nausea, vomiting, CP. Does states he is SOB and has abdominal pain.      Mode: AC/ CMV (Assist Control/ Continuous Mandatory Ventilation), RR (machine): 18, TV (machine): 450, FiO2: 100, PEEP: 10, MAP: 14, PIP: 34I&O's Summary    Allergies    No Known Allergies    Intolerances      HEALTH ISSUES - PROBLEM Dx:  Shock: Shock  COPD exacerbation: COPD exacerbation  Human metapneumovirus (hMPV) pneumonia: Human metapneumovirus (hMPV) pneumonia  Acute respiratory failure with hypoxia and hypercapnia: Acute respiratory failure with hypoxia and hypercapnia  Pneumonia: Pneumonia  Chronic obstructive pulmonary disease, unspecified COPD type: Chronic obstructive pulmonary disease, unspecified COPD type  Gastric reflux: Gastric reflux  Benign prostatic hyperplasia, presence of lower urinary tract symptoms unspecified, unspecified morphology: Benign prostatic hyperplasia, presence of lower urinary tract symptoms unspecified, unspecified morphology  Essential hypertension: Essential hypertension        PAST MEDICAL & SURGICAL HISTORY:  Gastric ulcer  BPH (Benign Prostatic Hyperplasia)  Gastric reflux  COPD (chronic obstructive pulmonary disease)  Anxiety disorder  HTN (hypertension)  S/P colonoscopy      Vital Signs Last 24 Hrs  T(C): 36.3, Max: 37 (03-16 @ 02:55)  T(F): 97.4, Max: 98.6 (03-16 @ 02:55)  HR: 118 (100 - 144)  BP: 106/59 (60/42 - 152/92)  BP(mean): 76 (47 - 89)  RR: 13 (13 - 38)  SpO2: 92% (90% - 98%)      PHYSICAL EXAM:    GENERAL: On BiPAP, some respiratory distress   HEAD:  Atraumatic, Normocephalic  EYES: EOMI, PERRLA, conjunctiva and sclera clear  NERVOUS SYSTEM:  Alert & Oriented X3,  Moves upper and lower extremities;  CHEST/LUNG: Poor air movement, decreased breath sounds  HEART: Regular rate and rhythm; No murmurs,   ABDOMEN: Firm, Nontender,distended; Bowel sounds present  EXTREMITIES:  Peripheral Pulses, No  cyanosis, or edema  SKIN: No rashes or lesions    pantoprazole    Tablet 40milliGRAM(s) Oral before breakfast  tamsulosin 0.4milliGRAM(s) Oral at bedtime  levoFLOXacin IVPB 750milliGRAM(s) IV Intermittent every 24 hours  enoxaparin Injectable 40milliGRAM(s) SubCutaneous every 24 hours  methylPREDNISolone sodium succinate Injectable 40milliGRAM(s) IV Push every 8 hours  ALBUTerol/ipratropium for Nebulization 3milliLiter(s) Nebulizer every 4 hours  ALBUTerol    0.083% 2.5milliGRAM(s) Nebulizer every 4 hours PRN  ALBUTerol    0.083%. 15milliGRAM(s) Nebulizer once  norepinephrine Infusion 0.02MICROgram(s)/kG/Min IV Continuous <Continuous>  sodium chloride 0.9% Bolus 1000milliLiter(s) IV Bolus once  hydrocortisone  Injectable 50milliGRAM(s) IV Push every 6 hours  chlorhexidine 0.12% Liquid 15milliLiter(s) Swish and Spit two times a day      LABS:  ABG - ( 16 Mar 2017 03:36 )  pH: 7.36  /  pCO2: 56    /  pO2: 137   / HCO3: 28    / Base Excess: 4.4   /  SaO2: 99                                    13.9   8.9   )-----------( 194      ( 16 Mar 2017 06:35 )             42.5     16 Mar 2017 06:37    138    |  92     |  27.0   ----------------------------<  143    4.5     |  33.0   |  0.59     Ca    8.7        16 Mar 2017 06:37  Phos  3.9       16 Mar 2017 06:37  Mg     2.4       16 Mar 2017 06:37    TPro  7.3    /  Alb  3.9    /  TBili  0.3    /  DBili  x      /  AST  57     /  ALT  36     /  AlkPhos  60     16 Mar 2017 06:37    LIVER FUNCTIONS - ( 16 Mar 2017 06:37 )  Alb: 3.9 g/dL / Pro: 7.3 g/dL / ALK PHOS: 60 U/L / ALT: 36 U/L / AST: 57 U/L / GGT: x             CARDIAC MARKERS ( 15 Mar 2017 14:00 )  x     / <0.01 ng/mL / x     / x     / x            CAPILLARY BLOOD GLUCOSE      RADIOLOGY & ADDITIONAL TESTS:     EXAM:  CHEST SINGLE VIEW FRONTAL                          PROCEDURE DATE:  03/16/2017      IMPRESSION:    Support Devices: None  Heart:  Unremarkable  Mediastinum:  Unremarkable  Lungs/Airways: Hyperinflation likely reflecting emphysema. Reticular   nodular opacities right base, stable. No new findings.  Bones/Soft tissues: Unremarkable    Consultant notes reviewed    Case discussed with consultant/provider/ family /patient

## 2017-03-16 NOTE — PATIENT PROFILE ADULT. - VISION (WITH CORRECTIVE LENSES IF THE PATIENT USUALLY WEARS THEM):
Severely impaired: cannot locate objects without hearing or touching them or patient nonresponsive./nikole

## 2017-03-16 NOTE — ED ADULT NURSE REASSESSMENT NOTE - NS ED NURSE REASSESS COMMENT FT1
sinus tach at 101 on cm. pt sitting calm in bed. breathing even and but slightly labored on bipap. a and o x3. will continue to monitor.

## 2017-03-16 NOTE — ED ADULT NURSE REASSESSMENT NOTE - NS ED NURSE REASSESS COMMENT FT1
Dr. Lombardi made aware of patient's SV=040. ZE=180/92. Pt. asymptomatic, sinus tach on cm, appears to be calmer s/p xanax. As per MD, reassess heart rate in an hour s/p lopressor. Pt. not in distress.

## 2017-03-16 NOTE — ED ADULT NURSE REASSESSMENT NOTE - NS ED NURSE REASSESS COMMENT FT1
pt reports sob, diminished to all lung fields, using scessory muscles to breath, spo2 87, still on bipap, dr. keene informed. rt called to bedside for treatment and abg as pr dr. keene. awaiting bedside assessment by dr. keene.

## 2017-03-16 NOTE — PROGRESS NOTE ADULT - SUBJECTIVE AND OBJECTIVE BOX
17:30hrs.  multiple attempts at placing arterial line failed.  left radial and right axillary attempted.    jesus alberto almeida

## 2017-03-17 DIAGNOSIS — I46.9 CARDIAC ARREST, CAUSE UNSPECIFIED: ICD-10-CM

## 2017-03-17 DIAGNOSIS — D53.9 NUTRITIONAL ANEMIA, UNSPECIFIED: ICD-10-CM

## 2017-03-17 LAB
ANION GAP SERPL CALC-SCNC: 9 MMOL/L — SIGNIFICANT CHANGE UP (ref 5–17)
BUN SERPL-MCNC: 49 MG/DL — HIGH (ref 8–20)
CALCIUM SERPL-MCNC: 8 MG/DL — LOW (ref 8.6–10.2)
CHLORIDE SERPL-SCNC: 91 MMOL/L — LOW (ref 98–107)
CO2 SERPL-SCNC: 35 MMOL/L — HIGH (ref 22–29)
CREAT SERPL-MCNC: 0.93 MG/DL — SIGNIFICANT CHANGE UP (ref 0.5–1.3)
CULTURE RESULTS: NO GROWTH — SIGNIFICANT CHANGE UP
GAS PNL BLDA: SIGNIFICANT CHANGE UP
GLUCOSE SERPL-MCNC: 184 MG/DL — HIGH (ref 70–115)
GRAM STN FLD: SIGNIFICANT CHANGE UP
HCT VFR BLD CALC: 42.3 % — SIGNIFICANT CHANGE UP (ref 42–52)
HGB BLD-MCNC: 13.4 G/DL — LOW (ref 14–18)
MCHC RBC-ENTMCNC: 31.7 G/DL — LOW (ref 32–36)
MCHC RBC-ENTMCNC: 32.4 PG — HIGH (ref 27–31)
MCV RBC AUTO: 102.4 FL — HIGH (ref 80–94)
PLATELET # BLD AUTO: 201 K/UL — SIGNIFICANT CHANGE UP (ref 150–400)
POTASSIUM SERPL-MCNC: 4.6 MMOL/L — SIGNIFICANT CHANGE UP (ref 3.5–5.3)
POTASSIUM SERPL-SCNC: 4.6 MMOL/L — SIGNIFICANT CHANGE UP (ref 3.5–5.3)
RBC # BLD: 4.13 M/UL — LOW (ref 4.6–6.2)
RBC # FLD: 13.3 % — SIGNIFICANT CHANGE UP (ref 11–15.6)
SODIUM SERPL-SCNC: 135 MMOL/L — SIGNIFICANT CHANGE UP (ref 135–145)
SPECIMEN SOURCE: SIGNIFICANT CHANGE UP
SPECIMEN SOURCE: SIGNIFICANT CHANGE UP
WBC # BLD: 13.8 K/UL — HIGH (ref 4.8–10.8)
WBC # FLD AUTO: 13.8 K/UL — HIGH (ref 4.8–10.8)

## 2017-03-17 PROCEDURE — 99291 CRITICAL CARE FIRST HOUR: CPT

## 2017-03-17 PROCEDURE — 93010 ELECTROCARDIOGRAM REPORT: CPT

## 2017-03-17 RX ORDER — FENTANYL CITRATE 50 UG/ML
50 INJECTION INTRAVENOUS ONCE
Qty: 0 | Refills: 0 | Status: DISCONTINUED | OUTPATIENT
Start: 2017-03-17 | End: 2017-03-17

## 2017-03-17 RX ORDER — HYDROCORTISONE 20 MG
100 TABLET ORAL EVERY 8 HOURS
Qty: 0 | Refills: 0 | Status: DISCONTINUED | OUTPATIENT
Start: 2017-03-17 | End: 2017-03-17

## 2017-03-17 RX ORDER — HYDROCORTISONE 20 MG
50 TABLET ORAL EVERY 6 HOURS
Qty: 0 | Refills: 0 | Status: DISCONTINUED | OUTPATIENT
Start: 2017-03-17 | End: 2017-03-19

## 2017-03-17 RX ORDER — FOLIC ACID 0.8 MG
1 TABLET ORAL DAILY
Qty: 0 | Refills: 0 | Status: DISCONTINUED | OUTPATIENT
Start: 2017-03-17 | End: 2017-03-23

## 2017-03-17 RX ORDER — DIGOXIN 250 MCG
0.25 TABLET ORAL ONCE
Qty: 0 | Refills: 0 | Status: COMPLETED | OUTPATIENT
Start: 2017-03-17 | End: 2017-03-17

## 2017-03-17 RX ORDER — THIAMINE MONONITRATE (VIT B1) 100 MG
100 TABLET ORAL DAILY
Qty: 0 | Refills: 0 | Status: DISCONTINUED | OUTPATIENT
Start: 2017-03-17 | End: 2017-03-23

## 2017-03-17 RX ORDER — DEXMEDETOMIDINE HYDROCHLORIDE IN 0.9% SODIUM CHLORIDE 4 UG/ML
0.4 INJECTION INTRAVENOUS
Qty: 200 | Refills: 0 | Status: DISCONTINUED | OUTPATIENT
Start: 2017-03-17 | End: 2017-03-19

## 2017-03-17 RX ORDER — DIGOXIN 250 MCG
0.25 TABLET ORAL ONCE
Qty: 0 | Refills: 0 | Status: COMPLETED | OUTPATIENT
Start: 2017-03-17 | End: 2017-03-18

## 2017-03-17 RX ADMIN — PANTOPRAZOLE SODIUM 40 MILLIGRAM(S): 20 TABLET, DELAYED RELEASE ORAL at 12:48

## 2017-03-17 RX ADMIN — Medication 1 MILLIGRAM(S): at 12:49

## 2017-03-17 RX ADMIN — DEXMEDETOMIDINE HYDROCHLORIDE IN 0.9% SODIUM CHLORIDE 9.98 MICROGRAM(S)/KG/HR: 4 INJECTION INTRAVENOUS at 15:33

## 2017-03-17 RX ADMIN — Medication 3 MILLILITER(S): at 03:58

## 2017-03-17 RX ADMIN — CHLORHEXIDINE GLUCONATE 15 MILLILITER(S): 213 SOLUTION TOPICAL at 06:00

## 2017-03-17 RX ADMIN — Medication 3.74 MICROGRAM(S)/KG/MIN: at 15:33

## 2017-03-17 RX ADMIN — Medication 100 MILLIGRAM(S): at 05:57

## 2017-03-17 RX ADMIN — DEXMEDETOMIDINE HYDROCHLORIDE IN 0.9% SODIUM CHLORIDE 9.98 MICROGRAM(S)/KG/HR: 4 INJECTION INTRAVENOUS at 19:33

## 2017-03-17 RX ADMIN — FENTANYL CITRATE 50 MICROGRAM(S): 50 INJECTION INTRAVENOUS at 07:42

## 2017-03-17 RX ADMIN — CHLORHEXIDINE GLUCONATE 15 MILLILITER(S): 213 SOLUTION TOPICAL at 17:25

## 2017-03-17 RX ADMIN — Medication 0.25 MILLIGRAM(S): at 20:46

## 2017-03-17 RX ADMIN — Medication 3.74 MICROGRAM(S)/KG/MIN: at 05:57

## 2017-03-17 RX ADMIN — FENTANYL CITRATE 50 MICROGRAM(S): 50 INJECTION INTRAVENOUS at 08:00

## 2017-03-17 RX ADMIN — Medication 3 MILLILITER(S): at 08:41

## 2017-03-17 RX ADMIN — Medication 50 MILLIGRAM(S): at 17:25

## 2017-03-17 RX ADMIN — Medication 100 MILLIGRAM(S): at 12:49

## 2017-03-17 RX ADMIN — DEXMEDETOMIDINE HYDROCHLORIDE IN 0.9% SODIUM CHLORIDE 9.98 MICROGRAM(S)/KG/HR: 4 INJECTION INTRAVENOUS at 12:40

## 2017-03-17 RX ADMIN — Medication 50 MILLIGRAM(S): at 12:49

## 2017-03-17 NOTE — PROGRESS NOTE ADULT - ATTENDING COMMENTS
I updated brother in law (healthcare proxy Velasquez Dumas at bedside (his cell number is [phone]).  Also updated Father in law (same name).  Patient's wishes were not to be on a ventilator long term, but per proxy is ok with being on a ventilator short term. I updated brother in law (healthcare proxy Velasquez Dumas at bedside (his cell number is [phone]).  Also updated Father in law (same name).  Patient's wishes were not to be on a ventilator long term, but per proxy is ok with being on a ventilator short term.    Increase tube feeds to goal; hold on free water given low-end sodium levels    RASS 0  CAM neg  CVC 3/16  earlene (indic shock)

## 2017-03-17 NOTE — PROGRESS NOTE ADULT - SUBJECTIVE AND OBJECTIVE BOX
Patient is a 71y old  Male who presents with a chief complaint of     BRIEF HOSPITAL COURSE: ***    Events last 24 hours: ***    PAST MEDICAL & SURGICAL HISTORY:  Gastric ulcer  BPH (Benign Prostatic Hyperplasia)  Gastric reflux  COPD (chronic obstructive pulmonary disease)  Anxiety disorder  HTN (hypertension)  S/P colonoscopy      Review of Systems:  CONSTITUTIONAL: No fever, chills, or fatigue  EYES: No eye pain, visual disturbances, or discharge  ENMT:  No difficulty hearing, tinnitus, vertigo; No sinus or throat pain  NECK: No pain or stiffness  RESPIRATORY: No cough, wheezing, chills or hemoptysis; No shortness of breath  CARDIOVASCULAR: No chest pain, palpitations, dizziness, or leg swelling  GASTROINTESTINAL: No abdominal or epigastric pain. No nausea, vomiting, or hematemesis; No diarrhea or constipation. No melena or hematochezia.  GENITOURINARY: No dysuria, frequency, hematuria, or incontinence  NEUROLOGICAL: No headaches, memory loss, loss of strength, numbness, or tremors  SKIN: No itching, burning, rashes, or lesions   MUSCULOSKELETAL: No joint pain or swelling; No muscle, back, or extremity pain  PSYCHIATRIC: No depression, anxiety, mood swings, or difficulty sleeping      Medications:  levoFLOXacin IVPB 750milliGRAM(s) IV Intermittent every 24 hours    tamsulosin 0.4milliGRAM(s) Oral at bedtime  norepinephrine Infusion 0.02MICROgram(s)/kG/Min IV Continuous <Continuous>  digoxin  Injectable 0.25milliGRAM(s) IV Push once    ALBUTerol/ipratropium for Nebulization 3milliLiter(s) Nebulizer every 4 hours  ALBUTerol    0.083% 2.5milliGRAM(s) Nebulizer every 4 hours PRN  ALBUTerol    0.083%. 15milliGRAM(s) Nebulizer once    dexmedetomidine Infusion 0.4MICROgram(s)/kG/Hr IV Continuous <Continuous>      enoxaparin Injectable 40milliGRAM(s) SubCutaneous every 24 hours    pantoprazole  Injectable 40milliGRAM(s) IV Push daily      hydrocortisone  Injectable 50milliGRAM(s) IV Push every 6 hours    folic acid 1milliGRAM(s) Oral daily  thiamine 100milliGRAM(s) Oral daily      chlorhexidine 0.12% Liquid 15milliLiter(s) Swish and Spit two times a day        Mode: AC/ CMV (Assist Control/ Continuous Mandatory Ventilation)  RR (machine): 20  TV (machine): 450  FiO2: 60  PEEP: 8  MAP: 12  PIP: 35      ICU Vital Signs Last 24 Hrs  T(C): 37.2, Max: 38.3 ( @ 11:00)  T(F): 98.9, Max: 100.9 ( @ 11:00)  HR: 95 (93 - 141)  BP: 88/50 (58/37 - 145/74)  BP(mean): 63 (43 - 101)  ABP: --  ABP(mean): --  RR: 26 (0 - 42)  SpO2: 97% (88% - 98%)      ABG - ( 17 Mar 2017 04:56 )  pH: 7.27  /  pCO2: 82    /  pO2: 97    / HCO3: 33    / Base Excess: 9.3   /  SaO2: x                   I&O's Detail  I & Os for 24h ending 17 Mar 2017 07:00  =============================================  IN:    0.9% NaCl: 1000 ml    norepinephrine Infusion: 540.2 ml    Solution: 150 ml    Total IN: 1690.2 ml  ---------------------------------------------  OUT:    Indwelling Catheter - Urethral: 675 ml    Total OUT: 675 ml  ---------------------------------------------  Total NET: 1015.2 ml    I & Os for current day (as of 17 Mar 2017 22:22)  =============================================  IN:    Jevity: 700 ml    0.9% NaCl: 500 ml    norepinephrine Infusion: 482.3 ml    dexmedetomidine Infusion: 106 ml    Total IN: 1788.3 ml  ---------------------------------------------  OUT:    Incontinent per Condom Catheter: 310 ml    Indwelling Catheter - Urethral: 180 ml    Total OUT: 490 ml  ---------------------------------------------  Total NET: 1298.3 ml        LABS:                        13.4   13.8  )-----------( 201      ( 17 Mar 2017 06:49 )             42.3     17 Mar 2017 06:49    135    |  91     |  49.0   ----------------------------<  184    4.6     |  35.0   |  0.93     Ca    8.0        17 Mar 2017 06:49  Phos  3.7       16 Mar 2017 17:20  Mg     2.5       16 Mar 2017 17:20    TPro  7.3    /  Alb  3.9    /  TBili  0.3    /  DBili  x      /  AST  57     /  ALT  36     /  AlkPhos  60     16 Mar 2017 06:37          CAPILLARY BLOOD GLUCOSE      Urinalysis Basic - ( 16 Mar 2017 17:20 )    Color: Yellow / Appearance: Clear / S.030 / pH: x  Gluc: x / Ketone: Trace  / Bili: Negative / Urobili: Negative mg/dL   Blood: x / Protein: 30 mg/dL / Nitrite: Negative   Leuk Esterase: Negative / RBC: 3-5 /HPF / WBC 0-2   Sq Epi: x / Non Sq Epi: Occasional / Bacteria: Occasional      CULTURES:  Culture Results:   No growth ( @ 17:19)  Rapid RVP Result: Detected (03-15 @ 21:37)  Culture Results:   No growth at 48 hours (03-15 @ 14:03)  Culture Results:   No growth at 48 hours (03-15 @ 14:03)      Physical Examination:    General: No acute distress.      HEENT: Pupils equal, reactive to light.  Symmetric.    PULM: Clear to auscultation bilaterally, no significant sputum production    CVS: Regular rate and rhythm, no murmurs, rubs, or gallops    ABD: Soft, nondistended, nontender, normoactive bowel sounds, no masses    EXT: No edema, nontender    SKIN: Warm and well perfused, no rashes noted.    NEURO: Alert, oriented, interactive, nonfocal    RADIOLOGY: ***    CRITICAL CARE TIME SPENT: *** Patient is a 71y old  Male who presents with a chief complaint of shortness of breath.    BRIEF HOSPITAL COURSE: 72 yo male, PMHx COPD, HTN, presented with shortness of breath, found to have metapneumovirus and subsequent COPD exacerbation. Started on BiPAP per pulmonology recommendations. Patient failed trial of BiPAP, became tachycardic and lethargic, and was intubated for hypercapneic respiratory failure. Following intubation, patient became hypotensive and had no palpable pulse. ACLS was initiated, and ROSC was achieved after 2-3 minutes of CPR after receiving 1 dose of epinephrine, no shocks. Patient is alert, but remains intubated and on pressors for hemodynamic support.       Events last 24 hours: Called to patient's bedside for rapid heart rate in 170's and hypotensive on pressors with SBP in 80's, Levophed dose increased to titrate to a MAP > 65. Patient with paroxysmal SVT, no response to carotid massage, digoxin IVP x 2 doses given with temporary response. Patient again became tachycardic, with sustained SVT in 160's. Adenosine 6mg IVP given for SVT with no hemodynamic instability. HR normalized briefly then was sustained at approx 112 bmp. HR now regular narrow complex tachycardia at 105-110 and titrating down on pressors as tolerated.    PAST MEDICAL & SURGICAL HISTORY:  Gastric ulcer  BPH (Benign Prostatic Hyperplasia)  Gastric reflux  COPD (chronic obstructive pulmonary disease)  Anxiety disorder  HTN (hypertension)  S/P colonoscopy      Review of Systems:  CONSTITUTIONAL: No fever, chills, or fatigue  EYES: No eye pain, visual disturbances, or discharge  ENMT:  No difficulty hearing, tinnitus, vertigo; No sinus or throat pain  NECK: No pain or stiffness  RESPIRATORY: No cough, wheezing, chills or hemoptysis; No shortness of breath  CARDIOVASCULAR: No chest pain, palpitations, dizziness, or leg swelling  GASTROINTESTINAL: No abdominal or epigastric pain. No nausea, vomiting, or hematemesis; No diarrhea or constipation. No melena or hematochezia.  GENITOURINARY: No dysuria, frequency, hematuria, or incontinence  NEUROLOGICAL: No headaches, memory loss, loss of strength, numbness, or tremors  SKIN: No itching, burning, rashes, or lesions   MUSCULOSKELETAL: No joint pain or swelling; No muscle, back, or extremity pain  PSYCHIATRIC: No depression, anxiety, mood swings, or difficulty sleeping      Medications:  levoFLOXacin IVPB 750milliGRAM(s) IV Intermittent every 24 hours    tamsulosin 0.4milliGRAM(s) Oral at bedtime  norepinephrine Infusion 0.02MICROgram(s)/kG/Min IV Continuous <Continuous>  digoxin  Injectable 0.25milliGRAM(s) IV Push once    ALBUTerol/ipratropium for Nebulization 3milliLiter(s) Nebulizer every 4 hours  ALBUTerol    0.083% 2.5milliGRAM(s) Nebulizer every 4 hours PRN  ALBUTerol    0.083%. 15milliGRAM(s) Nebulizer once    dexmedetomidine Infusion 0.4MICROgram(s)/kG/Hr IV Continuous <Continuous>      enoxaparin Injectable 40milliGRAM(s) SubCutaneous every 24 hours    pantoprazole  Injectable 40milliGRAM(s) IV Push daily      hydrocortisone  Injectable 50milliGRAM(s) IV Push every 6 hours    folic acid 1milliGRAM(s) Oral daily  thiamine 100milliGRAM(s) Oral daily      chlorhexidine 0.12% Liquid 15milliLiter(s) Swish and Spit two times a day        Mode: AC/ CMV (Assist Control/ Continuous Mandatory Ventilation)  RR (machine): 20  TV (machine): 450  FiO2: 60  PEEP: 8  MAP: 12  PIP: 35      ICU Vital Signs Last 24 Hrs  T(C): 37.2, Max: 38.3 ( @ 11:00)  T(F): 98.9, Max: 100.9 ( @ 11:00)  HR: 95 (93 - 141)  BP: 88/50 (58/37 - 145/74)  BP(mean): 63 (43 - 101)  ABP: --  ABP(mean): --  RR: 26 (0 - 42)  SpO2: 97% (88% - 98%)      ABG - ( 17 Mar 2017 04:56 )  pH: 7.27  /  pCO2: 82    /  pO2: 97    / HCO3: 33    / Base Excess: 9.3   /  SaO2: x                   I&O's Detail  I & Os for 24h ending 17 Mar 2017 07:00  =============================================  IN:    0.9% NaCl: 1000 ml    norepinephrine Infusion: 540.2 ml    Solution: 150 ml    Total IN: 1690.2 ml  ---------------------------------------------  OUT:    Indwelling Catheter - Urethral: 675 ml    Total OUT: 675 ml  ---------------------------------------------  Total NET: 1015.2 ml    I & Os for current day (as of 17 Mar 2017 22:22)  =============================================  IN:    Jevity: 700 ml    0.9% NaCl: 500 ml    norepinephrine Infusion: 482.3 ml    dexmedetomidine Infusion: 106 ml    Total IN: 1788.3 ml  ---------------------------------------------  OUT:    Incontinent per Condom Catheter: 310 ml    Indwelling Catheter - Urethral: 180 ml    Total OUT: 490 ml  ---------------------------------------------  Total NET: 1298.3 ml        LABS:                        13.4   13.8  )-----------( 201      ( 17 Mar 2017 06:49 )             42.3     17 Mar 2017 06:49    135    |  91     |  49.0   ----------------------------<  184    4.6     |  35.0   |  0.93     Ca    8.0        17 Mar 2017 06:49  Phos  3.7       16 Mar 2017 17:20  Mg     2.5       16 Mar 2017 17:20    TPro  7.3    /  Alb  3.9    /  TBili  0.3    /  DBili  x      /  AST  57     /  ALT  36     /  AlkPhos  60     16 Mar 2017 06:37          CAPILLARY BLOOD GLUCOSE      Urinalysis Basic - ( 16 Mar 2017 17:20 )    Color: Yellow / Appearance: Clear / S.030 / pH: x  Gluc: x / Ketone: Trace  / Bili: Negative / Urobili: Negative mg/dL   Blood: x / Protein: 30 mg/dL / Nitrite: Negative   Leuk Esterase: Negative / RBC: 3-5 /HPF / WBC 0-2   Sq Epi: x / Non Sq Epi: Occasional / Bacteria: Occasional      CULTURES:  Culture Results:   No growth ( @ 17:19)  Rapid RVP Result: Detected (03-15 @ 21:37)  Culture Results:   No growth at 48 hours (03-15 @ 14:03)  Culture Results:   No growth at 48 hours (03-15 @ 14:03)      Physical Examination:    General: No acute distress.      HEENT: Pupils equal, reactive to light.  Symmetric.    PULM: Clear to auscultation bilaterally, no significant sputum production    CVS: Regular rate and rhythm, no murmurs, rubs, or gallops    ABD: Soft, nondistended, nontender, normoactive bowel sounds, no masses    EXT: No edema, nontender    SKIN: Warm and well perfused, no rashes noted.    NEURO: Alert, oriented, interactive, nonfocal    RADIOLOGY: ***    CRITICAL CARE TIME SPENT: *** Patient is a 71y old  Male who presents with a chief complaint of shortness of breath.    BRIEF HOSPITAL COURSE: 72 yo male, PMHx COPD, HTN, presented with shortness of breath, found to have metapneumovirus and subsequent COPD exacerbation. Started on BiPAP per pulmonology recommendations. Patient failed trial of BiPAP, became tachycardic and lethargic, and was intubated for hypercapneic respiratory failure. Following intubation, patient became hypotensive and had no palpable pulse. ACLS was initiated, and ROSC was achieved after 2-3 minutes of CPR after receiving 1 dose of epinephrine, no shocks. Patient is alert, but remains intubated and on pressors for hemodynamic support.       Events last 24 hours: Called to patient's bedside for rapid heart rate in 170's and hypotensive on pressors with SBP in 80's, Levophed dose increased to titrate to a MAP > 65. Patient with paroxysmal SVT, no response to carotid massage, digoxin IVP x 2 doses given with temporary response. Patient again became tachycardic, with sustained SVT in 160's. Adenosine 6mg IVP given for SVT with no hemodynamic instability. HR normalized briefly then was sustained at approx 112 bmp. HR now regular narrow complex tachycardia at 105-110 and titrating down on pressors as tolerated.    PAST MEDICAL & SURGICAL HISTORY:  Gastric ulcer  BPH (Benign Prostatic Hyperplasia)  Gastric reflux  COPD (chronic obstructive pulmonary disease)  Anxiety disorder  HTN (hypertension)  S/P colonoscopy      Review of Systems: TACHYCARDIA. Unable to attain due to patient's acute medical condition.      Medications:  levoFLOXacin IVPB 750milliGRAM(s) IV Intermittent every 24 hours    tamsulosin 0.4milliGRAM(s) Oral at bedtime  norepinephrine Infusion 0.02MICROgram(s)/kG/Min IV Continuous <Continuous>  digoxin  Injectable 0.25milliGRAM(s) IV Push once    ALBUTerol/ipratropium for Nebulization 3milliLiter(s) Nebulizer every 4 hours  ALBUTerol    0.083% 2.5milliGRAM(s) Nebulizer every 4 hours PRN  ALBUTerol    0.083%. 15milliGRAM(s) Nebulizer once    dexmedetomidine Infusion 0.4MICROgram(s)/kG/Hr IV Continuous <Continuous>      enoxaparin Injectable 40milliGRAM(s) SubCutaneous every 24 hours    pantoprazole  Injectable 40milliGRAM(s) IV Push daily      hydrocortisone  Injectable 50milliGRAM(s) IV Push every 6 hours    folic acid 1milliGRAM(s) Oral daily  thiamine 100milliGRAM(s) Oral daily      chlorhexidine 0.12% Liquid 15milliLiter(s) Swish and Spit two times a day        Mode: AC/ CMV (Assist Control/ Continuous Mandatory Ventilation)  RR (machine): 20  TV (machine): 450  FiO2: 60  PEEP: 8  MAP: 12  PIP: 35      ICU Vital Signs Last 24 Hrs  T(C): 37.2, Max: 38.3 ( @ 11:00)  T(F): 98.9, Max: 100.9 ( @ 11:00)  HR: 95 (93 - 141)  BP: 88/50 (58/37 - 145/74)  BP(mean): 63 (43 - 101)  ABP: --  ABP(mean): --  RR: 26 (0 - 42)  SpO2: 97% (88% - 98%)      ABG - ( 17 Mar 2017 04:56 )  pH: 7.27  /  pCO2: 82    /  pO2: 97    / HCO3: 33    / Base Excess: 9.3   /  SaO2: x                   I&O's Detail  I & Os for 24h ending 17 Mar 2017 07:00  =============================================  IN:    0.9% NaCl: 1000 ml    norepinephrine Infusion: 540.2 ml    Solution: 150 ml    Total IN: 1690.2 ml  ---------------------------------------------  OUT:    Indwelling Catheter - Urethral: 675 ml    Total OUT: 675 ml  ---------------------------------------------  Total NET: 1015.2 ml    I & Os for current day (as of 17 Mar 2017 22:22)  =============================================  IN:    Jevity: 700 ml    0.9% NaCl: 500 ml    norepinephrine Infusion: 482.3 ml    dexmedetomidine Infusion: 106 ml    Total IN: 1788.3 ml  ---------------------------------------------  OUT:    Incontinent per Condom Catheter: 310 ml    Indwelling Catheter - Urethral: 180 ml    Total OUT: 490 ml  ---------------------------------------------  Total NET: 1298.3 ml        LABS:                        13.4   13.8  )-----------( 201      ( 17 Mar 2017 06:49 )             42.3     17 Mar 2017 06:49    135    |  91     |  49.0   ----------------------------<  184    4.6     |  35.0   |  0.93     Ca    8.0        17 Mar 2017 06:49  Phos  3.7       16 Mar 2017 17:20  Mg     2.5       16 Mar 2017 17:20    TPro  7.3    /  Alb  3.9    /  TBili  0.3    /  DBili  x      /  AST  57     /  ALT  36     /  AlkPhos  60     16 Mar 2017 06:37          CAPILLARY BLOOD GLUCOSE      Urinalysis Basic - ( 16 Mar 2017 17:20 )    Color: Yellow / Appearance: Clear / S.030 / pH: x  Gluc: x / Ketone: Trace  / Bili: Negative / Urobili: Negative mg/dL   Blood: x / Protein: 30 mg/dL / Nitrite: Negative   Leuk Esterase: Negative / RBC: 3-5 /HPF / WBC 0-2   Sq Epi: x / Non Sq Epi: Occasional / Bacteria: Occasional      CULTURES:  Culture Results:   No growth ( @ 17:19)  Rapid RVP Result: Detected (03-15 @ 21:37)  Culture Results:   No growth at 48 hours (03-15 @ 14:03)  Culture Results:   No growth at 48 hours (03-15 @ 14:03)      Physical Examination:    General: No acute distress.     HEENT: Pupils equal, reactive to light.  Symmetric.    PULM: INTUBATED. Clear to auscultation bilaterally, no significant sputum production    CVS: SVT, no murmurs, rubs, or gallops    ABD: Soft, nondistended, nontender, normoactive bowel sounds, no masses    EXT: No edema, nontender    SKIN: Warm and well perfused, no rashes noted.    NEURO: RASS -2.    RADIOLOGY:   Comparisons:  Chest x-ray dated 3/16/2017    Findings: The lungs are clear. ET tube in good position at the level the   aortic arch. Nasogastric tube is present in the stomach. There are no   infiltrates, congestion or pleural effusions. The pulmonary vasculature   and aorta are normal for age. Heart size is unremarkable. The thorax is   normal for age.    Impression: No acute pulmonary disease. No significant change.    CRITICAL CARE TIME SPENT: I have spent 30 minutes of critical care time evaluating and treating this patient, including collaborating with the interdisciplinary team. Patient is a 71y old  Male who presents with a chief complaint of shortness of breath.    BRIEF HOSPITAL COURSE: 70 yo male, PMHx COPD, HTN, presented with shortness of breath, found to have metapneumovirus and subsequent COPD exacerbation. Started on BiPAP per pulmonology recommendations. Patient failed trial of BiPAP, became tachycardic and lethargic, and was intubated for hypercapneic respiratory failure. Following intubation, patient became hypotensive and had no palpable pulse. ACLS was initiated, and ROSC was achieved after 2-3 minutes of CPR after receiving 1 dose of epinephrine, no shocks. Patient is alert, but remains intubated and on pressors for hemodynamic support.       Events last 24 hours: Called to patient's bedside for rapid heart rate in 170's and hypotensive on pressors with SBP in 80's, Levophed dose increased to titrate to a MAP > 65. Patient with paroxysmal SVT, no response to carotid massage, digoxin IVP x 2 doses given with temporary response. Patient again became tachycardic, with sustained SVT in 160's. Adenosine 6mg IVP given for SVT with no hemodynamic instability. HR normalized briefly then was sustained at approx 112 bmp. HR now regular narrow complex tachycardia at 105-110 and titrating down on pressors as tolerated. EKG obtained, reveals no acute ischemic event.    PAST MEDICAL & SURGICAL HISTORY:  Gastric ulcer  BPH (Benign Prostatic Hyperplasia)  Gastric reflux  COPD (chronic obstructive pulmonary disease)  Anxiety disorder  HTN (hypertension)  S/P colonoscopy      Review of Systems: TACHYCARDIA. Unable to attain due to patient's acute medical condition.      Medications:  levoFLOXacin IVPB 750milliGRAM(s) IV Intermittent every 24 hours    tamsulosin 0.4milliGRAM(s) Oral at bedtime  norepinephrine Infusion 0.02MICROgram(s)/kG/Min IV Continuous <Continuous>  digoxin  Injectable 0.25milliGRAM(s) IV Push once    ALBUTerol/ipratropium for Nebulization 3milliLiter(s) Nebulizer every 4 hours  ALBUTerol    0.083% 2.5milliGRAM(s) Nebulizer every 4 hours PRN  ALBUTerol    0.083%. 15milliGRAM(s) Nebulizer once    dexmedetomidine Infusion 0.4MICROgram(s)/kG/Hr IV Continuous <Continuous>      enoxaparin Injectable 40milliGRAM(s) SubCutaneous every 24 hours    pantoprazole  Injectable 40milliGRAM(s) IV Push daily      hydrocortisone  Injectable 50milliGRAM(s) IV Push every 6 hours    folic acid 1milliGRAM(s) Oral daily  thiamine 100milliGRAM(s) Oral daily      chlorhexidine 0.12% Liquid 15milliLiter(s) Swish and Spit two times a day        Mode: AC/ CMV (Assist Control/ Continuous Mandatory Ventilation)  RR (machine): 20  TV (machine): 450  FiO2: 60  PEEP: 8  MAP: 12  PIP: 35      ICU Vital Signs Last 24 Hrs  T(C): 37.2, Max: 38.3 ( @ 11:00)  T(F): 98.9, Max: 100.9 ( @ 11:00)  HR: 95 (93 - 141)  BP: 88/50 (58/37 - 145/74)  BP(mean): 63 (43 - 101)  ABP: --  ABP(mean): --  RR: 26 (0 - 42)  SpO2: 97% (88% - 98%)      ABG - ( 17 Mar 2017 04:56 )  pH: 7.27  /  pCO2: 82    /  pO2: 97    / HCO3: 33    / Base Excess: 9.3   /  SaO2: x                   I&O's Detail  I & Os for 24h ending 17 Mar 2017 07:00  =============================================  IN:    0.9% NaCl: 1000 ml    norepinephrine Infusion: 540.2 ml    Solution: 150 ml    Total IN: 1690.2 ml  ---------------------------------------------  OUT:    Indwelling Catheter - Urethral: 675 ml    Total OUT: 675 ml  ---------------------------------------------  Total NET: 1015.2 ml    I & Os for current day (as of 17 Mar 2017 22:22)  =============================================  IN:    Jevity: 700 ml    0.9% NaCl: 500 ml    norepinephrine Infusion: 482.3 ml    dexmedetomidine Infusion: 106 ml    Total IN: 1788.3 ml  ---------------------------------------------  OUT:    Incontinent per Condom Catheter: 310 ml    Indwelling Catheter - Urethral: 180 ml    Total OUT: 490 ml  ---------------------------------------------  Total NET: 1298.3 ml        LABS:                        13.4   13.8  )-----------( 201      ( 17 Mar 2017 06:49 )             42.3     17 Mar 2017 06:49    135    |  91     |  49.0   ----------------------------<  184    4.6     |  35.0   |  0.93     Ca    8.0        17 Mar 2017 06:49  Phos  3.7       16 Mar 2017 17:20  Mg     2.5       16 Mar 2017 17:20    TPro  7.3    /  Alb  3.9    /  TBili  0.3    /  DBili  x      /  AST  57     /  ALT  36     /  AlkPhos  60     16 Mar 2017 06:37          CAPILLARY BLOOD GLUCOSE      Urinalysis Basic - ( 16 Mar 2017 17:20 )    Color: Yellow / Appearance: Clear / S.030 / pH: x  Gluc: x / Ketone: Trace  / Bili: Negative / Urobili: Negative mg/dL   Blood: x / Protein: 30 mg/dL / Nitrite: Negative   Leuk Esterase: Negative / RBC: 3-5 /HPF / WBC 0-2   Sq Epi: x / Non Sq Epi: Occasional / Bacteria: Occasional      CULTURES:  Culture Results:   No growth ( @ 17:19)  Rapid RVP Result: Detected (03-15 @ 21:37)  Culture Results:   No growth at 48 hours (03-15 @ 14:03)  Culture Results:   No growth at 48 hours (03-15 @ 14:03)      Physical Examination:    General: No acute distress.     HEENT: Pupils equal, reactive to light.  Symmetric.    PULM: INTUBATED. Clear to auscultation bilaterally, no significant sputum production    CVS: SVT, no murmurs, rubs, or gallops    ABD: Soft, nondistended, nontender, normoactive bowel sounds, no masses    EXT: No edema, nontender    SKIN: Warm and well perfused, no rashes noted.    NEURO: RASS -2.    RADIOLOGY:   Comparisons:  Chest x-ray dated 3/16/2017    Findings: The lungs are clear. ET tube in good position at the level the   aortic arch. Nasogastric tube is present in the stomach. There are no   infiltrates, congestion or pleural effusions. The pulmonary vasculature   and aorta are normal for age. Heart size is unremarkable. The thorax is   normal for age.    Impression: No acute pulmonary disease. No significant change.    CRITICAL CARE TIME SPENT: I have spent 30 minutes of critical care time evaluating and treating this patient, including collaborating with the interdisciplinary team.

## 2017-03-17 NOTE — PROGRESS NOTE ADULT - SUBJECTIVE AND OBJECTIVE BOX
Patient is a 71y old  Male who presents with a chief complaint of     BRIEF HOSPITAL COURSE: 72 yo male, PMHx COPD, HTN, presented with shortness of breath, found to have metapneumovirus and subsequent COPD exacerbation. Started on BiPAP per pulmonology recommendations.    Events past 24 hours: Patient failed trial of BiPAP, became tachycardic and lethargic, and was intubated for hypercapneic respiratory failure. Following intubation, patient became hypotensive and had no palpable pulse. ACLS was initiated, and ROSC was achieved after 2-3 minutes of CPR after receiving 1 dose of epinephrine. Patient is alert, but remains intubated and on pressors for hemodynamic support.       PAST MEDICAL & SURGICAL HISTORY:  Gastric ulcer  BPH (Benign Prostatic Hyperplasia)  Gastric reflux  COPD (chronic obstructive pulmonary disease)  Anxiety disorder  HTN (hypertension)  S/P colonoscopy      Review of Systems:  CONSTITUTIONAL: No fever, chills, or fatigue  EYES: No eye pain, visual disturbances, or discharge  ENMT:  No difficulty hearing, tinnitus, vertigo; No sinus or throat pain  NECK: No pain or stiffness  RESPIRATORY: No cough, wheezing, chills or hemoptysis; No shortness of breath  CARDIOVASCULAR: No chest pain, palpitations, dizziness, or leg swelling  GASTROINTESTINAL: No abdominal or epigastric pain. No nausea, vomiting, or hematemesis; No diarrhea or constipation. No melena or hematochezia.  GENITOURINARY: No dysuria, frequency, hematuria, or incontinence  NEUROLOGICAL: No headaches, memory loss, loss of strength, numbness, or tremors  SKIN: No itching, burning, rashes, or lesions   MUSCULOSKELETAL: No joint pain or swelling; No muscle, back, or extremity pain  PSYCHIATRIC: No depression, anxiety, mood swings, or difficulty sleeping      Medications:  levoFLOXacin IVPB 750milliGRAM(s) IV Intermittent every 24 hours    tamsulosin 0.4milliGRAM(s) Oral at bedtime  norepinephrine Infusion 0.02MICROgram(s)/kG/Min IV Continuous <Continuous>    ALBUTerol/ipratropium for Nebulization 3milliLiter(s) Nebulizer every 4 hours  ALBUTerol    0.083% 2.5milliGRAM(s) Nebulizer every 4 hours PRN  ALBUTerol    0.083%. 15milliGRAM(s) Nebulizer once        enoxaparin Injectable 40milliGRAM(s) SubCutaneous every 24 hours    pantoprazole  Injectable 40milliGRAM(s) IV Push daily      hydrocortisone  Injectable 100milliGRAM(s) IV Push every 8 hours        chlorhexidine 0.12% Liquid 15milliLiter(s) Swish and Spit two times a day        Mode: AC/ CMV (Assist Control/ Continuous Mandatory Ventilation)  RR (machine): 20  TV (machine): 450  FiO2: 50  PEEP: 10  MAP: 13  PIP: 30      ICU Vital Signs Last 24 Hrs  T(C): 38, Max: 38 ( @ 01:00)  T(F): 100.4, Max: 100.4 ( @ 01:00)  HR: 133 (100 - 144)  BP: 109/68 (60/42 - 152/92)  BP(mean): 82 (47 - 103)  ABP: --  ABP(mean): --  RR: 18 (13 - 38)  SpO2: 95% (86% - 100%)      ABG - ( 16 Mar 2017 23:45 )  pH: 7.29  /  pCO2: 74    /  pO2: 80    / HCO3: 31    / Base Excess: 7.1   /  SaO2: 96                  I&O's Detail    I & Os for current day (as of 17 Mar 2017 03:59)  =============================================  IN:    Sodium Chloride 0.9% IV Bolus: 1000 ml    norepinephrine Infusion: 472.6 ml    Solution: 150 ml    Total IN: 1622.6 ml  ---------------------------------------------  OUT:    Indwelling Catheter - Urethral: 510 ml    Total OUT: 510 ml  ---------------------------------------------  Total NET: 1112.6 ml        LABS:                        13.5   19.0  )-----------( 218      ( 16 Mar 2017 17:20 )             42.3     16 Mar 2017 17:20    134    |  91     |  40.0   ----------------------------<  193    4.6     |  30.0   |  1.00     Ca    8.1        16 Mar 2017 17:20  Phos  3.7       16 Mar 2017 17:20  Mg     2.5       16 Mar 2017 17:20    TPro  7.3    /  Alb  3.9    /  TBili  0.3    /  DBili  x      /  AST  57     /  ALT  36     /  AlkPhos  60     16 Mar 2017 06:37      CARDIAC MARKERS ( 15 Mar 2017 14:00 )  x     / <0.01 ng/mL / x     / x     / x          CAPILLARY BLOOD GLUCOSE      Urinalysis Basic - ( 16 Mar 2017 17:20 )    Color: Yellow / Appearance: Clear / S.030 / pH: x  Gluc: x / Ketone: Trace  / Bili: Negative / Urobili: Negative mg/dL   Blood: x / Protein: 30 mg/dL / Nitrite: Negative   Leuk Esterase: Negative / RBC: 3-5 /HPF / WBC 0-2   Sq Epi: x / Non Sq Epi: Occasional / Bacteria: Occasional      CULTURES:  Rapid RVP Result: Detected (03-15 @ 21:37)      Physical Examination:    General: No acute distress.      HEENT: Pupils equal, reactive to light.  Symmetric.    PULM: Clear to auscultation bilaterally, no significant sputum production    CVS: Regular rate and rhythm, no murmurs, rubs, or gallops    ABD: Soft, nondistended, nontender, normoactive bowel sounds, no masses    EXT: No edema, nontender    SKIN: Warm and well perfused, no rashes noted.    NEURO: Alert, oriented, interactive, nonfocal    RADIOLOGY: ***    CRITICAL CARE TIME SPENT: *** Patient is a 71y old  Male who presents with a chief complaint of     BRIEF HOSPITAL COURSE: 70 yo male, PMHx COPD, HTN, presented with shortness of breath, found to have metapneumovirus and subsequent COPD exacerbation. Started on BiPAP per pulmonology recommendations.    Events past 24 hours: Patient failed trial of BiPAP, became tachycardic and lethargic, and was intubated for hypercapneic respiratory failure. Following intubation, patient became hypotensive and had no palpable pulse. ACLS was initiated, and ROSC was achieved after 2-3 minutes of CPR after receiving 1 dose of epinephrine, no shocks. Patient is alert, but remains intubated and on pressors for hemodynamic support.       PAST MEDICAL & SURGICAL HISTORY:  Gastric ulcer  BPH (Benign Prostatic Hyperplasia)  Gastric reflux  COPD (chronic obstructive pulmonary disease)  Anxiety disorder  HTN (hypertension)  S/P colonoscopy      Review of Systems:  CONSTITUTIONAL: No fever, chills, or fatigue  EYES: No eye pain, visual disturbances, or discharge  ENMT:  No difficulty hearing, tinnitus, vertigo; No sinus or throat pain  NECK: No pain or stiffness  RESPIRATORY: No cough, wheezing, chills or hemoptysis; No shortness of breath  CARDIOVASCULAR: No chest pain, palpitations, dizziness, or leg swelling  GASTROINTESTINAL: No abdominal or epigastric pain. No nausea, vomiting, or hematemesis; No diarrhea or constipation. No melena or hematochezia.  GENITOURINARY: No dysuria, frequency, hematuria, or incontinence  NEUROLOGICAL: No headaches, memory loss, loss of strength, numbness, or tremors  SKIN: No itching, burning, rashes, or lesions   MUSCULOSKELETAL: No joint pain or swelling; No muscle, back, or extremity pain  PSYCHIATRIC: No depression, anxiety, mood swings, or difficulty sleeping      Medications:  levoFLOXacin IVPB 750milliGRAM(s) IV Intermittent every 24 hours    tamsulosin 0.4milliGRAM(s) Oral at bedtime  norepinephrine Infusion 0.02MICROgram(s)/kG/Min IV Continuous <Continuous>    ALBUTerol/ipratropium for Nebulization 3milliLiter(s) Nebulizer every 4 hours  ALBUTerol    0.083% 2.5milliGRAM(s) Nebulizer every 4 hours PRN  ALBUTerol    0.083%. 15milliGRAM(s) Nebulizer once        enoxaparin Injectable 40milliGRAM(s) SubCutaneous every 24 hours    pantoprazole  Injectable 40milliGRAM(s) IV Push daily      hydrocortisone  Injectable 100milliGRAM(s) IV Push every 8 hours        chlorhexidine 0.12% Liquid 15milliLiter(s) Swish and Spit two times a day        Mode: AC/ CMV (Assist Control/ Continuous Mandatory Ventilation)  RR (machine): 20  TV (machine): 450  FiO2: 50  PEEP: 10  MAP: 13  PIP: 30      ICU Vital Signs Last 24 Hrs  T(C): 38, Max: 38 ( @ 01:00)  T(F): 100.4, Max: 100.4 ( @ 01:00)  HR: 133 (100 - 144)  BP: 109/68 (60/42 - 152/92)  BP(mean): 82 (47 - 103)  ABP: --  ABP(mean): --  RR: 18 (13 - 38)  SpO2: 95% (86% - 100%)      ABG - ( 16 Mar 2017 23:45 )  pH: 7.29  /  pCO2: 74    /  pO2: 80    / HCO3: 31    / Base Excess: 7.1   /  SaO2: 96                  I&O's Detail    I & Os for current day (as of 17 Mar 2017 03:59)  =============================================  IN:    Sodium Chloride 0.9% IV Bolus: 1000 ml    norepinephrine Infusion: 472.6 ml    Solution: 150 ml    Total IN: 1622.6 ml  ---------------------------------------------  OUT:    Indwelling Catheter - Urethral: 510 ml    Total OUT: 510 ml  ---------------------------------------------  Total NET: 1112.6 ml        LABS:                        13.5   19.0  )-----------( 218      ( 16 Mar 2017 17:20 )             42.3     16 Mar 2017 17:20    134    |  91     |  40.0   ----------------------------<  193    4.6     |  30.0   |  1.00     Ca    8.1        16 Mar 2017 17:20  Phos  3.7       16 Mar 2017 17:20  Mg     2.5       16 Mar 2017 17:20    TPro  7.3    /  Alb  3.9    /  TBili  0.3    /  DBili  x      /  AST  57     /  ALT  36     /  AlkPhos  60     16 Mar 2017 06:37      CARDIAC MARKERS ( 15 Mar 2017 14:00 )  x     / <0.01 ng/mL / x     / x     / x          CAPILLARY BLOOD GLUCOSE      Urinalysis Basic - ( 16 Mar 2017 17:20 )    Color: Yellow / Appearance: Clear / S.030 / pH: x  Gluc: x / Ketone: Trace  / Bili: Negative / Urobili: Negative mg/dL   Blood: x / Protein: 30 mg/dL / Nitrite: Negative   Leuk Esterase: Negative / RBC: 3-5 /HPF / WBC 0-2   Sq Epi: x / Non Sq Epi: Occasional / Bacteria: Occasional      CULTURES:  Rapid RVP Result: Detected (03-15 @ 21:37)      Physical Examination:    General: No acute distress.      HEENT: Pupils equal, reactive to light.  Symmetric.    PULM: Intubated, on PEEP 10 / FiO2 70%. Clear to auscultation bilaterally, no significant sputum production    CVS: Tachycardic 120's, regular rhythm, no murmurs, rubs, or gallops    ABD: Soft, nondistended, nontender, normoactive bowel sounds, no masses    EXT: No edema, nontender    SKIN: Warm and well perfused, no rashes noted.    NEURO: Easily arousable to verbal stimuli. Follows commands, answers simple questions, nonfocal    RADIOLOGY:     EXAM:  CHEST SINGLE VIEW FRONTAL                          PROCEDURE DATE:  2017        INTERPRETATION:  History: Intubated.    Technique:  AP portable    Comparisons:  Chest x-ray dated 3/16/2017    Findings: The lungs are clear. ET tube in good position at the level the   aortic arch. Nasogastric tube is present in the stomach. There are no   infiltrates, congestion or pleural effusions. The pulmonary vasculature   and aorta are normal for age. Heart size is unremarkable. The thorax is   normal for age.    Impression: No acute pulmonary disease. No significant change.    CRITICAL CARE TIME SPENT: I have spent 40 minutes of critical care time evaluating and treating this patient.

## 2017-03-17 NOTE — PROGRESS NOTE ADULT - ASSESSMENT
Acute on chronic hypercapneic respiratory failure from human metapneumovirus and COPD exacerbation with shock.

## 2017-03-17 NOTE — PROGRESS NOTE ADULT - ASSESSMENT
72 yo male, PMHx COPD, HTN, presented with shortness of breath, found to have metapneumovirus and COPD exacerbation, intubated for hypercarbic respiratory failure with subsequent cardiac arrest, now in SVT.  1. SVT- Rate sinus tach holding in low 110's after receiving adenosine, BP still requiring pressor support. Will continue to monitor heart rate without further intervention at this time and will titrate down vasopressors as tolerated. If BP remains normotensive on minimal pressor support, will consider additional agent such as BB for rate control.  2. Hypercarbic Respiratory Failure- Patient still intubated, with slightly more acidosis and CO2 retention. Will check ABG and continue to titrate vent settings. 72 yo male, PMHx COPD, HTN, presented with shortness of breath, found to have metapneumovirus and COPD exacerbation, intubated for hypercarbic respiratory failure with subsequent cardiac arrest, now in SVT.  1. SVT- Rate sinus tach holding in low 110's after receiving adenosine, BP still requiring pressor support. Will continue to monitor heart rate without further intervention at this time and will titrate down vasopressors as tolerated, and obtain repeat EKG in morning. If BP remains normotensive on minimal pressor support, will consider additional agent such as BB for rate control.  2. Hypercarbic Respiratory Failure- Patient still intubated, with slightly more acidosis and CO2 retention. Will check ABG and continue to titrate vent settings.  3. Shock- Etiology remains unclear, still requiring vasopressor support. Will continue to titrate pressors as tolerated, trend lactate, and antibiotics.

## 2017-03-17 NOTE — PROGRESS NOTE ADULT - SUBJECTIVE AND OBJECTIVE BOX
Patient is a 71y old  Male who presents with a chief complaint of dyspnea    Events last 24 hours: Has improved considerably from yesterday; was admitted 3/15 with acute-on-chronic respiratory failure from metapneumovirus and COPD exacerbation, intubated 3/16 and had brief cardiac arrest (PEA without hypoxia); resuscitated quickly with epinephrine and 2 minutes of CPR, woke up.  Now remains in shock but with markedly improved respiratory status.    PAST MEDICAL & SURGICAL HISTORY:  Gastric ulcer  BPH (Benign Prostatic Hyperplasia)  Gastric reflux  COPD (chronic obstructive pulmonary disease)  Anxiety disorder  HTN (hypertension)  S/P colonoscopy      Review of Systems:  unable to obtain, intubated.      Medications:  levoFLOXacin IVPB 750milliGRAM(s) IV Intermittent every 24 hours    tamsulosin 0.4milliGRAM(s) Oral at bedtime  norepinephrine Infusion 0.02MICROgram(s)/kG/Min IV Continuous <Continuous>    ALBUTerol/ipratropium for Nebulization 3milliLiter(s) Nebulizer every 4 hours  ALBUTerol    0.083% 2.5milliGRAM(s) Nebulizer every 4 hours PRN  ALBUTerol    0.083%. 15milliGRAM(s) Nebulizer once        enoxaparin Injectable 40milliGRAM(s) SubCutaneous every 24 hours    pantoprazole  Injectable 40milliGRAM(s) IV Push daily      hydrocortisone  Injectable 50milliGRAM(s) IV Push every 6 hours    folic acid 1milliGRAM(s) Oral daily  thiamine 100milliGRAM(s) Oral daily      chlorhexidine 0.12% Liquid 15milliLiter(s) Swish and Spit two times a day        Mode: AC/ CMV (Assist Control/ Continuous Mandatory Ventilation)  RR (machine): 20  TV (machine): 450  FiO2: 50  PEEP: 5  MAP: 10  PIP: 34      ICU Vital Signs Last 24 Hrs  T(C): 38.2, Max: 38.2 (03-17 @ 10:00)  T(F): 100.8, Max: 100.8 (03-17 @ 10:00)  HR: 140 (100 - 140)  BP: 131/78 (58/37 - 147/90)  BP(mean): 97 (43 - 103)  ABP: --  ABP(mean): --  RR: 35 (0 - 42)  SpO2: 95% (86% - 100%)      ABG - ( 17 Mar 2017 04:56 )  pH: 7.27  /  pCO2: 82    /  pO2: 97    / HCO3: 33    / Base Excess: 9.3   /  SaO2: x                   I&O's Detail  I & Os for 24h ending 17 Mar 2017 07:00  =============================================  IN:    Sodium Chloride 0.9% IV Bolus: 1000 ml    norepinephrine Infusion: 540.2 ml    Solution: 150 ml    Total IN: 1690.2 ml  ---------------------------------------------  OUT:    Indwelling Catheter - Urethral: 675 ml    Total OUT: 675 ml  ---------------------------------------------  Total NET: 1015.2 ml    I & Os for current day (as of 17 Mar 2017 10:50)  =============================================  IN:    Jevity: 80 ml    norepinephrine Infusion: 67.6 ml    Total IN: 147.6 ml  ---------------------------------------------  OUT:    Indwelling Catheter - Urethral: 75 ml    Total OUT: 75 ml  ---------------------------------------------  Total NET: 72.6 ml        LABS:                        13.4   13.8  )-----------( 201      ( 17 Mar 2017 06:49 )             42.3     17 Mar 2017 06:49    135    |  91     |  49.0   ----------------------------<  184    4.6     |  35.0   |  0.93     Ca    8.0        17 Mar 2017 06:49  Phos  3.7       16 Mar 2017 17:20  Mg     2.5       16 Mar 2017 17:20    TPro  7.3    /  Alb  3.9    /  TBili  0.3    /  DBili  x      /  AST  57     /  ALT  36     /  AlkPhos  60     16 Mar 2017 06:37      CARDIAC MARKERS ( 15 Mar 2017 14:00 )  x     / <0.01 ng/mL / x     / x     / x          CAPILLARY BLOOD GLUCOSE      Urinalysis Basic - ( 16 Mar 2017 17:20 )    Color: Yellow / Appearance: Clear / S.030 / pH: x  Gluc: x / Ketone: Trace  / Bili: Negative / Urobili: Negative mg/dL   Blood: x / Protein: 30 mg/dL / Nitrite: Negative   Leuk Esterase: Negative / RBC: 3-5 /HPF / WBC 0-2   Sq Epi: x / Non Sq Epi: Occasional / Bacteria: Occasional      CULTURES:  Rapid RVP Result: Detected (03-15 @ 21:37)      Physical Examination:    General: No acute distress.  Alert and interactive, follows commands    HEENT: Pupils equal, reactive to light.  Symmetric.    PULM: Clear to auscultation bilaterally, no significant sputum production; markedly improved air movement, without wheezing.    CVS: Tachycardic rate and rhythm, no murmurs, rubs, or gallops    ABD: Soft, nondistended, nontender, normoactive bowel sounds, no masses    EXT: 1-2+ edema bilaterally.    SKIN: Warm and well perfused, no rashes noted.    NEURO: Alert, oriented, interactive, nonfocal        CRITICAL CARE TIME SPENT: 45 minutes

## 2017-03-17 NOTE — PROGRESS NOTE ADULT - ASSESSMENT
70 yo male, PMHx COPD, HTN, presented with shortness of breath, secondary to COPD exacerbation and metapneumovirus, subsequently went into hypercarbic respiratory failure and cardiac arrest with ROSC.  1. Hypercarbic Respiratory Failure- Persistent hypercarbic respiratory acidosis. Patient likely autopeeping contributing to current cardiovascular state. Intermittently desaturating to low 80's. Continue invasive ventilatory support with weaning of PEEP and FiO2 as tolerated to maintain SpO2 > 90%, weaned down to PEEP 5 / FiO2 50% presently and will follow up repeat ABG on current settings. Continue stress dose steroids, nebs.   2. Shock- Etiology remains unclear, possibly autopeep. Sepsis picture unlikely at this time, given lack of fever, infectious source, lactate 1.2. Will continue to give IV fluid hydration and titrate vasopressors to MAP > 65.  3. HTN- hold home meds as patient is currently hypotensive requiring vasopressor support.

## 2017-03-18 DIAGNOSIS — J96.90 RESPIRATORY FAILURE, UNSPECIFIED, UNSPECIFIED WHETHER WITH HYPOXIA OR HYPERCAPNIA: ICD-10-CM

## 2017-03-18 LAB
ANION GAP SERPL CALC-SCNC: 6 MMOL/L — SIGNIFICANT CHANGE UP (ref 5–17)
BASE EXCESS BLDA CALC-SCNC: 8.9 MMOL/L — HIGH (ref -3–3)
BLOOD GAS COMMENTS ARTERIAL: SIGNIFICANT CHANGE UP
BUN SERPL-MCNC: 51 MG/DL — HIGH (ref 8–20)
CALCIUM SERPL-MCNC: 8.5 MG/DL — LOW (ref 8.6–10.2)
CHLORIDE SERPL-SCNC: 96 MMOL/L — LOW (ref 98–107)
CO2 SERPL-SCNC: 41 MMOL/L — HIGH (ref 22–29)
CREAT SERPL-MCNC: 0.76 MG/DL — SIGNIFICANT CHANGE UP (ref 0.5–1.3)
GAS PNL BLDA: SIGNIFICANT CHANGE UP
GLUCOSE SERPL-MCNC: 185 MG/DL — HIGH (ref 70–115)
HCO3 BLDA-SCNC: 33 MMOL/L — HIGH (ref 20–26)
HCT VFR BLD CALC: 41.7 % — LOW (ref 42–52)
HGB BLD-MCNC: 12.7 G/DL — LOW (ref 14–18)
HOROWITZ INDEX BLDA+IHG-RTO: 0.5 — SIGNIFICANT CHANGE UP
MAGNESIUM SERPL-MCNC: 3.1 MG/DL — HIGH (ref 1.8–2.5)
MCHC RBC-ENTMCNC: 30.5 G/DL — LOW (ref 32–36)
MCHC RBC-ENTMCNC: 32.2 PG — HIGH (ref 27–31)
MCV RBC AUTO: 105.6 FL — HIGH (ref 80–94)
PCO2 BLDA: 89 MMHG — CRITICAL HIGH (ref 35–45)
PH BLDA: 7.26 — LOW (ref 7.35–7.45)
PHOSPHATE SERPL-MCNC: 2.2 MG/DL — LOW (ref 2.4–4.7)
PLATELET # BLD AUTO: 170 K/UL — SIGNIFICANT CHANGE UP (ref 150–400)
PO2 BLDA: 93 MMHG — SIGNIFICANT CHANGE UP (ref 83–108)
POTASSIUM SERPL-MCNC: 5 MMOL/L — SIGNIFICANT CHANGE UP (ref 3.5–5.3)
POTASSIUM SERPL-SCNC: 5 MMOL/L — SIGNIFICANT CHANGE UP (ref 3.5–5.3)
RBC # BLD: 3.95 M/UL — LOW (ref 4.6–6.2)
RBC # FLD: 13.5 % — SIGNIFICANT CHANGE UP (ref 11–15.6)
SAO2 % BLDA: 98 % — SIGNIFICANT CHANGE UP (ref 95–99)
SODIUM SERPL-SCNC: 143 MMOL/L — SIGNIFICANT CHANGE UP (ref 135–145)
WBC # BLD: 6.9 K/UL — SIGNIFICANT CHANGE UP (ref 4.8–10.8)
WBC # FLD AUTO: 6.9 K/UL — SIGNIFICANT CHANGE UP (ref 4.8–10.8)

## 2017-03-18 PROCEDURE — 99291 CRITICAL CARE FIRST HOUR: CPT

## 2017-03-18 RX ORDER — METOPROLOL TARTRATE 50 MG
5 TABLET ORAL EVERY 6 HOURS
Qty: 0 | Refills: 0 | Status: DISCONTINUED | OUTPATIENT
Start: 2017-03-18 | End: 2017-03-21

## 2017-03-18 RX ORDER — SODIUM CHLORIDE 9 MG/ML
1000 INJECTION INTRAMUSCULAR; INTRAVENOUS; SUBCUTANEOUS ONCE
Qty: 0 | Refills: 0 | Status: COMPLETED | OUTPATIENT
Start: 2017-03-18 | End: 2017-03-18

## 2017-03-18 RX ORDER — SODIUM CHLORIDE 9 MG/ML
1000 INJECTION, SOLUTION INTRAVENOUS ONCE
Qty: 0 | Refills: 0 | Status: COMPLETED | OUTPATIENT
Start: 2017-03-18 | End: 2017-03-18

## 2017-03-18 RX ORDER — ACETAZOLAMIDE 250 MG/1
250 TABLET ORAL EVERY 6 HOURS
Qty: 0 | Refills: 0 | Status: COMPLETED | OUTPATIENT
Start: 2017-03-18 | End: 2017-03-18

## 2017-03-18 RX ADMIN — Medication 50 MILLIGRAM(S): at 06:29

## 2017-03-18 RX ADMIN — Medication 5 MILLIGRAM(S): at 18:26

## 2017-03-18 RX ADMIN — Medication 1 MILLIGRAM(S): at 13:10

## 2017-03-18 RX ADMIN — Medication 50 MILLIGRAM(S): at 23:06

## 2017-03-18 RX ADMIN — ACETAZOLAMIDE 105 MILLIGRAM(S): 250 TABLET ORAL at 13:09

## 2017-03-18 RX ADMIN — SODIUM CHLORIDE 1000 MILLILITER(S): 9 INJECTION INTRAMUSCULAR; INTRAVENOUS; SUBCUTANEOUS at 22:00

## 2017-03-18 RX ADMIN — ENOXAPARIN SODIUM 40 MILLIGRAM(S): 100 INJECTION SUBCUTANEOUS at 00:12

## 2017-03-18 RX ADMIN — CHLORHEXIDINE GLUCONATE 15 MILLILITER(S): 213 SOLUTION TOPICAL at 06:29

## 2017-03-18 RX ADMIN — Medication 50 MILLIGRAM(S): at 13:10

## 2017-03-18 RX ADMIN — PANTOPRAZOLE SODIUM 40 MILLIGRAM(S): 20 TABLET, DELAYED RELEASE ORAL at 13:10

## 2017-03-18 RX ADMIN — Medication 100 MILLIGRAM(S): at 13:10

## 2017-03-18 RX ADMIN — Medication 3 MILLILITER(S): at 19:17

## 2017-03-18 RX ADMIN — TAMSULOSIN HYDROCHLORIDE 0.4 MILLIGRAM(S): 0.4 CAPSULE ORAL at 21:46

## 2017-03-18 RX ADMIN — Medication 3 MILLILITER(S): at 00:37

## 2017-03-18 RX ADMIN — ACETAZOLAMIDE 105 MILLIGRAM(S): 250 TABLET ORAL at 18:26

## 2017-03-18 RX ADMIN — Medication 3 MILLILITER(S): at 15:06

## 2017-03-18 RX ADMIN — CHLORHEXIDINE GLUCONATE 15 MILLILITER(S): 213 SOLUTION TOPICAL at 18:26

## 2017-03-18 RX ADMIN — DEXMEDETOMIDINE HYDROCHLORIDE IN 0.9% SODIUM CHLORIDE 9.98 MICROGRAM(S)/KG/HR: 4 INJECTION INTRAVENOUS at 18:00

## 2017-03-18 RX ADMIN — Medication 3 MILLILITER(S): at 03:38

## 2017-03-18 RX ADMIN — Medication 3 MILLILITER(S): at 08:25

## 2017-03-18 RX ADMIN — Medication 3 MILLILITER(S): at 12:38

## 2017-03-18 RX ADMIN — Medication 50 MILLIGRAM(S): at 00:13

## 2017-03-18 RX ADMIN — ENOXAPARIN SODIUM 40 MILLIGRAM(S): 100 INJECTION SUBCUTANEOUS at 21:46

## 2017-03-18 RX ADMIN — Medication 50 MILLIGRAM(S): at 18:26

## 2017-03-18 RX ADMIN — SODIUM CHLORIDE 1000 MILLILITER(S): 9 INJECTION, SOLUTION INTRAVENOUS at 21:46

## 2017-03-18 RX ADMIN — Medication 0.25 MILLIGRAM(S): at 21:08

## 2017-03-18 NOTE — PROGRESS NOTE ADULT - ASSESSMENT
A  AE advance COPD precipitated by viral URI  Acute hypoxic and hypercarbic respiratory failure  Possible small component of NIALL  P  Vent  Neb  Steroid A  AE advance COPD precipitated by viral URI  Acute hypoxic and hypercarbic respiratory failure  Possible small component of NIALL  Shock  Guarded outlook  P  Vent  Neb  Steroid (concur with solucorteff)  Levoflox  Pressors as needed

## 2017-03-18 NOTE — PROGRESS NOTE ADULT - ATTENDING COMMENTS
Palliative/Ethics: I updated brother in law (healthcare proxy Velasquez Dumas at bedside (his cell number is [phone]).  Also updated Father in law (same name).      RASS 0   CAM-ICU negative  Sedation: prn  VENT Bundle Reviewed: YES  Glycemic Control: prn  DVT PPX: enoxaparin  Nutrition: jevity  PT/OT: ordered    Invasive Lines/Henao:    - henao: 3/16: shock state requiring vasopressors  - fem TLC: 3/16; infusion of vasopressors  - fem arterial: 3/16: titration of vasopressors and frequent blood draws

## 2017-03-18 NOTE — PROGRESS NOTE ADULT - SUBJECTIVE AND OBJECTIVE BOX
PULMONARY PROGRESS NOTE      LELA JOSHUA-[id number]    Patient is a 71y old  Male who presents with a chief complaint of dyspnea    Severe COPD on symbicort and tudorza, but not 02 requiring as OP  AECOPD related to viral URI complicated by hypoxic and hypercarbic respiratory failure requiring intubation  Brief CP arrest and resuscitation  Occasional need for pressors  Occasional SVT  Sedated on vent    I    MEDICATIONS  (STANDING):  tamsulosin 0.4milliGRAM(s) Oral at bedtime  levoFLOXacin IVPB 750milliGRAM(s) IV Intermittent every 24 hours  enoxaparin Injectable 40milliGRAM(s) SubCutaneous every 24 hours  ALBUTerol/ipratropium for Nebulization 3milliLiter(s) Nebulizer every 4 hours  ALBUTerol    0.083%. 15milliGRAM(s) Nebulizer once  norepinephrine Infusion 0.02MICROgram(s)/kG/Min IV Continuous <Continuous>  chlorhexidine 0.12% Liquid 15milliLiter(s) Swish and Spit two times a day  pantoprazole  Injectable 40milliGRAM(s) IV Push daily  hydrocortisone  Injectable 50milliGRAM(s) IV Push every 6 hours  folic acid 1milliGRAM(s) Oral daily  thiamine 100milliGRAM(s) Oral daily  dexmedetomidine Infusion 0.4MICROgram(s)/kG/Hr IV Continuous <Continuous>  acetazolamide  IVPB 250milliGRAM(s) IV Intermittent every 6 hours      MEDICATIONS  (PRN):  ALBUTerol    0.083% 2.5milliGRAM(s) Nebulizer every 4 hours PRN Shortness of Breath and/or Wheezing      Allergies    No Known Allergies    Intolerances        PAST MEDICAL & SURGICAL HISTORY:  Gastric ulcer  BPH (Benign Prostatic Hyperplasia)  Gastric reflux  COPD (chronic obstructive pulmonary disease)  Anxiety disorder  HTN (hypertension)  S/P colonoscopy      SOCIAL HISTORY  Smoking History:       REVIEW OF SYSTEMS:    Sedated on Vent    Vital Signs Last 24 Hrs  T(C): 37.2, Max: 37.2 (03-17 @ 15:30)  T(F): 98.9, Max: 98.9 (03-17 @ 15:30)  HR: 128 (71 - 140)  BP: 129/72 (65/39 - 158/83)  BP(mean): 92 (47 - 113)  RR: 35 (20 - 39)  SpO2: 95% (94% - 99%)    PHYSICAL EXAMINATION:    GENERAL: The patient is awake and alert in no apparent distress.     HEENT: Head is normocephalic and atraumatic. Extraocular muscles are intact. Mucous membranes are moist.    NECK: Supple.    LUNGS: Diminished to auscultation without wheezing, rales or rhonchi; respirations unlabored    HEART: Regular rate and rhythm without murmur.    ABDOMEN: Soft, nontender, and nondistended.      EXTREMITIES: Without any cyanosis, clubbing, rash, lesions or edema.    NEUROLOGIC: Grossly intact.    LABS:                        12.7   6.9   )-----------( 170      ( 18 Mar 2017 06:25 )             41.7     18 Mar 2017 06:25    143    |  96     |  51.0   ----------------------------<  185    5.0     |  41.0   |  0.76     Ca    8.5        18 Mar 2017 06:25  Phos  2.2       18 Mar 2017 06:25  Mg     3.1       18 Mar 2017 06:25        Urinalysis Basic - ( 16 Mar 2017 17:20 )    Color: Yellow / Appearance: Clear / S.030 / pH: x  Gluc: x / Ketone: Trace  / Bili: Negative / Urobili: Negative mg/dL   Blood: x / Protein: 30 mg/dL / Nitrite: Negative   Leuk Esterase: Negative / RBC: 3-5 /HPF / WBC 0-2   Sq Epi: x / Non Sq Epi: Occasional / Bacteria: Occasional      ABG - ( 18 Mar 2017 02:02 )  pH: 7.26  /  pCO2: 89    /  pO2: 93    / HCO3: 33    / Base Excess: 8.9   /  SaO2: 98                      Serum Pro-Brain Natriuretic Peptide: 128 pg/mL (03-15 @ 13:59)          MICROBIOLOGY:    RADIOLOGY & ADDITIONAL STUDIES:

## 2017-03-18 NOTE — PROGRESS NOTE ADULT - SUBJECTIVE AND OBJECTIVE BOX
No Known Allergies                                                             Code Status:*FULL     ZAC JOSHUA Patient is a 71y old  Male who presents with a chief complaint of cough & SOB    BRIEF HOSPITAL COURSE: admitted with respiratory distress placed on NIV, found to have human metapneumonovirus, was moved to MICU for hypoxia and hypercapnia, intubated 3/16 for failed NIV, brief cardiac arrest with ROSC within 2-3 minutes and return of mental status. Shock state requiring intermittent norepinephrine.   Events last 24 hours: persistent tachycardia with bout of SVT overnight was given digoxin    Review of systems:   intubated and unable to fully asses      PAST MEDICAL & SURGICAL HISTORY:  Gastric ulcer  BPH (Benign Prostatic Hyperplasia)  Gastric reflux  COPD (chronic obstructive pulmonary disease)  Anxiety disorder  HTN (hypertension)  S/P colonoscopy        Medications:  levoFLOXacin IVPB 750milliGRAM(s) IV Intermittent every 24 hours    tamsulosin 0.4milliGRAM(s) Oral at bedtime  norepinephrine Infusion 0.02MICROgram(s)/kG/Min IV Continuous <Continuous>  acetazolamide  IVPB 250milliGRAM(s) IV Intermittent every 6 hours    ALBUTerol/ipratropium for Nebulization 3milliLiter(s) Nebulizer every 4 hours  ALBUTerol    0.083% 2.5milliGRAM(s) Nebulizer every 4 hours PRN  ALBUTerol    0.083%. 15milliGRAM(s) Nebulizer once    dexmedetomidine Infusion 0.4MICROgram(s)/kG/Hr IV Continuous <Continuous>      enoxaparin Injectable 40milliGRAM(s) SubCutaneous every 24 hours    pantoprazole  Injectable 40milliGRAM(s) IV Push daily      hydrocortisone  Injectable 50milliGRAM(s) IV Push every 6 hours    folic acid 1milliGRAM(s) Oral daily  thiamine 100milliGRAM(s) Oral daily      chlorhexidine 0.12% Liquid 15milliLiter(s) Swish and Spit two times a day        Mode: AC/ CMV (Assist Control/ Continuous Mandatory Ventilation)  RR (machine): 20  TV (machine): 450  FiO2: 50  PEEP: 8  MAP: 13  PIP: 70      Adult Advanced Hemodynamics Last 24 Hrs  CVP(mm Hg): --  CVP(cm H2O): --  CO: --  CI: --  PA: --  PA(mean): --  PCWP: --  SVR: --  SVRI: --  PVR: --  PVRI: --      ICU Vital Signs Last 24 Hrs  T(C): 37.2, Max: 38.3 ( @ 11:00)  T(F): 98.9, Max: 100.9 ( @ 11:00)  HR: 122 (71 - 141)  BP: 140/76 (65/39 - 158/83)  BP(mean): 99 (47 - 113)  ABP: --  ABP(mean): --  RR: 38 (20 - 42)  SpO2: 95% (94% - 99%)    CAPILLARY BLOOD GLUCOSE            LABS:  CBC Full  -  ( 18 Mar 2017 06:25 )  WBC Count : 6.9 K/uL  Hemoglobin : 12.7 g/dL  Hematocrit : 41.7 %  Platelet Count - Automated : 170 K/uL  Mean Cell Volume : 105.6 fl  Mean Cell Hemoglobin : 32.2 pg  Mean Cell Hemoglobin Concentration : 30.5 g/dL  Auto Neutrophil # : x  Auto Lymphocyte # : x  Auto Monocyte # : x  Auto Eosinophil # : x  Auto Basophil # : x  Auto Neutrophil % : x  Auto Lymphocyte % : x  Auto Monocyte % : x  Auto Eosinophil % : x  Auto Basophil % : x    18 Mar 2017 06:25    143    |  96     |  51.0   ----------------------------<  185    5.0     |  41.0   |  0.76     Ca    8.5        18 Mar 2017 06:25  Phos  2.2       18 Mar 2017 06:25  Mg     3.1       18 Mar 2017 06:25           D-Dimer Assay, Quantitative: <150 ng/mL DDU (03-15 @ 13:59)      Urinalysis Basic - ( 16 Mar 2017 17:20 )    Color: Yellow / Appearance: Clear / S.030 / pH: x  Gluc: x / Ketone: Trace  / Bili: Negative / Urobili: Negative mg/dL   Blood: x / Protein: 30 mg/dL / Nitrite: Negative   Leuk Esterase: Negative / RBC: 3-5 /HPF / WBC 0-2   Sq Epi: x / Non Sq Epi: Occasional / Bacteria: Occasional          CULTURES:  Culture Results:   No growth ( @ 17:19)  Rapid RVP Result: Detected (03-15 @ 21:37)  Culture Results:   No growth at 48 hours (03-15 @ 14:03)  Culture Results:   No growth at 48 hours (03-15 @ 14:03)      Physical Examination:    General: intubated, lethargic    HEENT: Atraumatic, MMM, Pupils sluggish equal, reactive to light.  Symmetric. ETT: OGT    PULM: diffuse wheeze, with coarse sounds bases primarily   CVS: tachycardia no murmurs, rubs; S1/S2. No JVD/HJR    ABD: Soft, nondistended, nontender, normoactive bowel sounds, no masses    EXT: dependent 1+ pitting edema, nontender. Distal pulses 2+ equal bilaterally: fem lines c/d/i    SKIN: Warm and well perfused, no rashes noted.    Neuro: nonfocal, moves all extremities.     RADIOLOGY:     PROCEDURE DATE:  2017        INTERPRETATION:  History: Intubated.    Technique:  AP portable    Comparisons:  Chest x-ray dated 3/16/2017    Findings: The lungs are clear. ET tube in good position at the level the   aortic arch. Nasogastric tube is present in the stomach. There are no   infiltrates, congestion or pleural effusions. The pulmonary vasculature   and aorta are normal for age. Heart size is unremarkable. The thorax is   normal for age.    Impression: No acute pulmonary disease. No significant change.    CRITICAL CARE TIME SPENT: 45 minutes

## 2017-03-18 NOTE — PROGRESS NOTE ADULT - ASSESSMENT
71M with combined hypoxia & hypercapnia respiratory failure requiring intubation, viral septic shock state, tachycardia

## 2017-03-19 DIAGNOSIS — J81.1 CHRONIC PULMONARY EDEMA: ICD-10-CM

## 2017-03-19 LAB
ANION GAP SERPL CALC-SCNC: <7 MMOL/L — SIGNIFICANT CHANGE UP (ref 5–17)
BASE EXCESS BLDA CALC-SCNC: 11 MMOL/L — HIGH (ref -3–3)
BLOOD GAS COMMENTS ARTERIAL: SIGNIFICANT CHANGE UP
BUN SERPL-MCNC: 48 MG/DL — HIGH (ref 8–20)
CALCIUM SERPL-MCNC: 8.1 MG/DL — LOW (ref 8.6–10.2)
CHLORIDE SERPL-SCNC: 99 MMOL/L — SIGNIFICANT CHANGE UP (ref 98–107)
CO2 SERPL-SCNC: >43 MMOL/L — CRITICAL HIGH (ref 22–29)
CREAT SERPL-MCNC: 0.76 MG/DL — SIGNIFICANT CHANGE UP (ref 0.5–1.3)
CULTURE RESULTS: SIGNIFICANT CHANGE UP
GAS PNL BLDA: SIGNIFICANT CHANGE UP
GAS PNL BLDA: SIGNIFICANT CHANGE UP
GLUCOSE SERPL-MCNC: 155 MG/DL — HIGH (ref 70–115)
HCO3 BLDA-SCNC: 34 MMOL/L — HIGH (ref 20–26)
HCT VFR BLD CALC: 40.7 % — LOW (ref 42–52)
HGB BLD-MCNC: 12.2 G/DL — LOW (ref 14–18)
HOROWITZ INDEX BLDA+IHG-RTO: SIGNIFICANT CHANGE UP
MAGNESIUM SERPL-MCNC: 3 MG/DL — HIGH (ref 1.8–2.5)
MCHC RBC-ENTMCNC: 30 G/DL — LOW (ref 32–36)
MCHC RBC-ENTMCNC: 32.1 PG — HIGH (ref 27–31)
MCV RBC AUTO: 107.1 FL — HIGH (ref 80–94)
PCO2 BLDA: 93 MMHG — CRITICAL HIGH (ref 35–45)
PH BLDA: 7.24 — LOW (ref 7.35–7.45)
PHOSPHATE SERPL-MCNC: 1.3 MG/DL — LOW (ref 2.4–4.7)
PLATELET # BLD AUTO: 201 K/UL — SIGNIFICANT CHANGE UP (ref 150–400)
PO2 BLDA: 76 MMHG — LOW (ref 83–108)
POTASSIUM SERPL-MCNC: 4.4 MMOL/L — SIGNIFICANT CHANGE UP (ref 3.5–5.3)
POTASSIUM SERPL-SCNC: 4.4 MMOL/L — SIGNIFICANT CHANGE UP (ref 3.5–5.3)
RBC # BLD: 3.8 M/UL — LOW (ref 4.6–6.2)
RBC # FLD: 13.4 % — SIGNIFICANT CHANGE UP (ref 11–15.6)
SAO2 % BLDA: 95 % — SIGNIFICANT CHANGE UP (ref 95–99)
SODIUM SERPL-SCNC: 149 MMOL/L — HIGH (ref 135–145)
SPECIMEN SOURCE: SIGNIFICANT CHANGE UP
WBC # BLD: 6.6 K/UL — SIGNIFICANT CHANGE UP (ref 4.8–10.8)
WBC # FLD AUTO: 6.6 K/UL — SIGNIFICANT CHANGE UP (ref 4.8–10.8)

## 2017-03-19 PROCEDURE — 99291 CRITICAL CARE FIRST HOUR: CPT

## 2017-03-19 PROCEDURE — 71010: CPT | Mod: 26

## 2017-03-19 RX ORDER — SODIUM CHLORIDE 9 MG/ML
1000 INJECTION, SOLUTION INTRAVENOUS ONCE
Qty: 0 | Refills: 0 | Status: COMPLETED | OUTPATIENT
Start: 2017-03-19 | End: 2017-03-19

## 2017-03-19 RX ORDER — SODIUM,POTASSIUM PHOSPHATES 278-250MG
1 POWDER IN PACKET (EA) ORAL
Qty: 0 | Refills: 0 | Status: DISCONTINUED | OUTPATIENT
Start: 2017-03-19 | End: 2017-03-19

## 2017-03-19 RX ORDER — NOREPINEPHRINE BITARTRATE/D5W 8 MG/250ML
0.05 PLASTIC BAG, INJECTION (ML) INTRAVENOUS
Qty: 8 | Refills: 0 | Status: DISCONTINUED | OUTPATIENT
Start: 2017-03-19 | End: 2017-03-20

## 2017-03-19 RX ORDER — MIDODRINE HYDROCHLORIDE 2.5 MG/1
5 TABLET ORAL THREE TIMES A DAY
Qty: 0 | Refills: 0 | Status: DISCONTINUED | OUTPATIENT
Start: 2017-03-19 | End: 2017-03-20

## 2017-03-19 RX ORDER — FENTANYL CITRATE 50 UG/ML
50 INJECTION INTRAVENOUS
Qty: 0 | Refills: 0 | Status: DISCONTINUED | OUTPATIENT
Start: 2017-03-19 | End: 2017-03-20

## 2017-03-19 RX ORDER — HYDROCORTISONE 20 MG
50 TABLET ORAL EVERY 8 HOURS
Qty: 0 | Refills: 0 | Status: DISCONTINUED | OUTPATIENT
Start: 2017-03-19 | End: 2017-03-21

## 2017-03-19 RX ORDER — FUROSEMIDE 40 MG
40 TABLET ORAL ONCE
Qty: 0 | Refills: 0 | Status: COMPLETED | OUTPATIENT
Start: 2017-03-19 | End: 2017-03-19

## 2017-03-19 RX ORDER — NOREPINEPHRINE BITARTRATE/D5W 8 MG/250ML
0.05 PLASTIC BAG, INJECTION (ML) INTRAVENOUS
Qty: 8 | Refills: 0 | Status: DISCONTINUED | OUTPATIENT
Start: 2017-03-19 | End: 2017-03-19

## 2017-03-19 RX ORDER — SODIUM,POTASSIUM PHOSPHATES 278-250MG
1 POWDER IN PACKET (EA) ORAL
Qty: 0 | Refills: 0 | Status: COMPLETED | OUTPATIENT
Start: 2017-03-19 | End: 2017-03-19

## 2017-03-19 RX ORDER — ACETAZOLAMIDE 250 MG/1
500 TABLET ORAL EVERY 8 HOURS
Qty: 0 | Refills: 0 | Status: COMPLETED | OUTPATIENT
Start: 2017-03-19 | End: 2017-03-19

## 2017-03-19 RX ADMIN — Medication 5 MILLIGRAM(S): at 23:26

## 2017-03-19 RX ADMIN — Medication 1 TABLET(S): at 22:45

## 2017-03-19 RX ADMIN — Medication 3 MILLILITER(S): at 23:55

## 2017-03-19 RX ADMIN — Medication 3 MILLILITER(S): at 11:47

## 2017-03-19 RX ADMIN — Medication 100 MILLIGRAM(S): at 12:10

## 2017-03-19 RX ADMIN — Medication 40 MILLIGRAM(S): at 03:30

## 2017-03-19 RX ADMIN — Medication 1 TABLET(S): at 12:10

## 2017-03-19 RX ADMIN — Medication 63.75 MILLIMOLE(S): at 09:17

## 2017-03-19 RX ADMIN — ENOXAPARIN SODIUM 40 MILLIGRAM(S): 100 INJECTION SUBCUTANEOUS at 22:44

## 2017-03-19 RX ADMIN — ACETAZOLAMIDE 110 MILLIGRAM(S): 250 TABLET ORAL at 14:54

## 2017-03-19 RX ADMIN — FENTANYL CITRATE 50 MICROGRAM(S): 50 INJECTION INTRAVENOUS at 12:09

## 2017-03-19 RX ADMIN — Medication 1 TABLET(S): at 18:17

## 2017-03-19 RX ADMIN — MIDODRINE HYDROCHLORIDE 5 MILLIGRAM(S): 2.5 TABLET ORAL at 14:45

## 2017-03-19 RX ADMIN — CHLORHEXIDINE GLUCONATE 15 MILLILITER(S): 213 SOLUTION TOPICAL at 18:17

## 2017-03-19 RX ADMIN — MIDODRINE HYDROCHLORIDE 5 MILLIGRAM(S): 2.5 TABLET ORAL at 22:45

## 2017-03-19 RX ADMIN — Medication 50 MILLIGRAM(S): at 22:45

## 2017-03-19 RX ADMIN — FENTANYL CITRATE 50 MICROGRAM(S): 50 INJECTION INTRAVENOUS at 18:55

## 2017-03-19 RX ADMIN — ACETAZOLAMIDE 110 MILLIGRAM(S): 250 TABLET ORAL at 22:44

## 2017-03-19 RX ADMIN — PANTOPRAZOLE SODIUM 40 MILLIGRAM(S): 20 TABLET, DELAYED RELEASE ORAL at 12:10

## 2017-03-19 RX ADMIN — FENTANYL CITRATE 50 MICROGRAM(S): 50 INJECTION INTRAVENOUS at 19:35

## 2017-03-19 RX ADMIN — Medication 3 MILLILITER(S): at 08:01

## 2017-03-19 RX ADMIN — FENTANYL CITRATE 50 MICROGRAM(S): 50 INJECTION INTRAVENOUS at 12:46

## 2017-03-19 RX ADMIN — CHLORHEXIDINE GLUCONATE 15 MILLILITER(S): 213 SOLUTION TOPICAL at 06:22

## 2017-03-19 RX ADMIN — Medication 3 MILLILITER(S): at 15:44

## 2017-03-19 RX ADMIN — Medication 50 MILLIGRAM(S): at 06:22

## 2017-03-19 RX ADMIN — SODIUM CHLORIDE 4000 MILLILITER(S): 9 INJECTION, SOLUTION INTRAVENOUS at 09:05

## 2017-03-19 RX ADMIN — Medication 9.36 MICROGRAM(S)/KG/MIN: at 12:45

## 2017-03-19 RX ADMIN — Medication 5 MILLIGRAM(S): at 12:08

## 2017-03-19 RX ADMIN — Medication 3 MILLILITER(S): at 04:09

## 2017-03-19 RX ADMIN — Medication 9.36 MICROGRAM(S)/KG/MIN: at 09:05

## 2017-03-19 RX ADMIN — Medication 3 MILLILITER(S): at 00:14

## 2017-03-19 RX ADMIN — Medication 3 MILLILITER(S): at 20:06

## 2017-03-19 RX ADMIN — Medication 50 MILLIGRAM(S): at 14:45

## 2017-03-19 RX ADMIN — Medication 1 MILLIGRAM(S): at 14:44

## 2017-03-19 NOTE — PROGRESS NOTE ADULT - ATTENDING COMMENTS
Palliative/Ethics: I updated brother in law healthcare proxy Velasquez Dumas at  cell number is [phone]).  He continued to state that Mr. Urban would not a prolonged ventilator stay, and I mentioned that if he is no better in next several days he may be unable to come off the vent; at this time we'll continue to attempt to optimize his renal/lung balance and fluid requirements with hopes to have him liberated from vent in time.     RASS -1  CAM-ICU positive (hypotension)  Sedation: prn  VENT Bundle Reviewed: YES  Glycemic Control: prn  DVT PPX: enoxaparin  Nutrition: jevity  PT/OT: ordered    Invasive Lines/Henao:    - henao: 3/16: shock state requiring vasopressors  - fem TLC: 3/16; infusion of vasopressors  - fem arterial: 3/16: titration of vasopressors and frequent blood draws

## 2017-03-19 NOTE — PROGRESS NOTE ADULT - SUBJECTIVE AND OBJECTIVE BOX
No Known Allergies                                                             Code Status:*FULL     ZAC JOSHUA Patient is a 71y old  Male who presents with a chief complaint of cough & SOB    BRIEF HOSPITAL COURSE: admitted with respiratory distress placed on NIV, found to have human metapneumonovirus, was moved to MICU for hypoxia and hypercapnia, intubated 3/16 for failed NIV, brief cardiac arrest with ROSC within 2-3 minutes and return of mental status. Shock state requiring intermittent norepinephrine.   Events last 24 hours: remains on vent. required intermittent norepinephrine this morning.     Review of systems:   intubated and unable to fully asses      PAST MEDICAL & SURGICAL HISTORY:  Gastric ulcer  BPH (Benign Prostatic Hyperplasia)  Gastric reflux  COPD (chronic obstructive pulmonary disease)  Anxiety disorder  HTN (hypertension)  S/P colonoscopy      MEDICATIONS  (STANDING):  tamsulosin 0.4milliGRAM(s) Oral at bedtime  levoFLOXacin IVPB 750milliGRAM(s) IV Intermittent every 24 hours  enoxaparin Injectable 40milliGRAM(s) SubCutaneous every 24 hours  ALBUTerol/ipratropium for Nebulization 3milliLiter(s) Nebulizer every 4 hours  ALBUTerol    0.083%. 15milliGRAM(s) Nebulizer once  chlorhexidine 0.12% Liquid 15milliLiter(s) Swish and Spit two times a day  pantoprazole  Injectable 40milliGRAM(s) IV Push daily  hydrocortisone  Injectable 50milliGRAM(s) IV Push every 6 hours  folic acid 1milliGRAM(s) Oral daily  thiamine 100milliGRAM(s) Oral daily  metoprolol Injectable 5milliGRAM(s) IV Push every 6 hours  norepinephrine Infusion 0.05MICROgram(s)/kG/Min IV Continuous <Continuous>  midodrine 5milliGRAM(s) Oral three times a day  acetazolamide  IVPB 500milliGRAM(s) IV Intermittent every 8 hours  potassium acid phosphate/sodium acid phosphate tablet (K-PHOS No. 2) 1Tablet(s) Oral four times a day with meals    MEDICATIONS  (PRN):  ALBUTerol    0.083% 2.5milliGRAM(s) Nebulizer every 4 hours PRN Shortness of Breath and/or Wheezing  fentaNYL    Injectable 50MICROGram(s) IV Push every 3 hours PRN Moderate Pain (4 - 6)      Mode: AC/ CMV (Assist Control/ Continuous Mandatory Ventilation)  RR (machine): 24  TV (machine): 500  FiO2: 50  PEEP: 8  MAP: 14  PIP: 35      Adult Advanced Hemodynamics Last 24 Hrs  CVP(mm Hg): --  CVP(cm H2O): --  CO: --  CI: --  PA: --  PA(mean): --  PCWP: --  SVR: --  SVRI: --  PVR: --  PVRI: --    ICU Vital Signs Last 24 Hrs  T(C): 36.8, Max: 38.1 (03-18 @ 16:00)  T(F): 98.2, Max: 100.5 (03-18 @ 16:00)  HR: 132 (98 - 133)  BP: 135/71 (65/40 - 163/82)  BP(mean): 100 (48 - 115)  ABP: --  ABP(mean): --  RR: 28 (20 - 42)  SpO2: 96% (91% - 100%)                LABS:                        12.2   6.6   )-----------( 201      ( 19 Mar 2017 05:56 )             40.7       19 Mar 2017 05:56    149    |  99     |  48.0   ----------------------------<  155    4.4     |  >43.0  |  0.76     Ca    8.1        19 Mar 2017 05:56  Phos  1.3       19 Mar 2017 05:56  Mg     3.0       19 Mar 2017 05:56      CULTURES:  Culture Results:   No growth (03-16 @ 17:19)  Rapid RVP Result: Detected (03-15 @ 21:37)  Culture Results:   No growth at 48 hours (03-15 @ 14:03)  Culture Results:   No growth at 48 hours (03-15 @ 14:03)      Physical Examination:    General: intubated, lethargic    HEENT: Atraumatic, MMM, Pupils sluggish equal, reactive to light.  Symmetric. ETT: OGT    PULM: diffuse wheeze, with coarse sounds bases primarily   CVS: tachycardia no murmurs, rubs; S1/S2. No JVD/HJR    ABD: Soft, nondistended, nontender, normoactive bowel sounds, no masses    EXT: dependent 1+ pitting edema, nontender. Distal pulses 2+ equal bilaterally: fem lines c/d/i    SKIN: Warm and well perfused, no rashes noted.    Neuro: nonfocal, moves all extremities.     RADIOLOGY:   PROCEDURE DATE:  03/19/2017        INTERPRETATION:  TECHNIQUE: Single portable view of the chest.    COMPARISON: 3/16/2017    CLINICAL HISTORY: Ovarian edema, respiratory difficulty.     FINDINGS:    Single frontal view of the chest demonstrates the endotracheal tube tip   to be above the spring at the thoracic inlet. NG tube tip courses below   the hemidiaphragm. Mild COPD changes. Possible small left lower lobe   infiltrate. The cardiomediastinal silhouette is normal. No acute osseous   abnormalities. Overlying EKG leads and wires are noted.    IMPRESSION: COPD changes. Possible small left lower lobe infiltrate.   Noncontrast chest CT is recommended.  CRITICAL CARE TIME SPENT: 45 minutes

## 2017-03-19 NOTE — PROGRESS NOTE ADULT - ASSESSMENT
71 year old male admitted with respiratory distress [facility] COPD exacerbation with viral PNA placed on BIPAP and  found to be positive for human metapneumonovirus, then moved to ICU for hypoxia and hypercapnia and intubated on the after failing BIPAP.

## 2017-03-19 NOTE — PROGRESS NOTE ADULT - SUBJECTIVE AND OBJECTIVE BOX
71y  Male  No Known Allergies    PAST MEDICAL & SURGICAL HISTORY:  Gastric ulcer  BPH (Benign Prostatic Hyperplasia)  Gastric reflux  COPD (chronic obstructive pulmonary disease)  Anxiety disorder  HTN (hypertension)  S/P colonoscopy      HPI:  71 years male with PMH of  COPD (on home oxygen) ,HTN,BPH,  presented to ER with SOB, cough and fever.  He was admitted with respiratory distress placed on BIPAP and  found ty positive for human metapneumonovirus. Patient was moved to ICU for hypoxia and hypercapnia, intubated on the 16th after failing BIPAP and had a brief cardiac arrest with ROSC within a couple minutes with  return of mental normal status. Pt has been in ICU had brief episode of SVT requiring treatment with adenosine to break two nights ago. last evening the pt was hypotensive possibly due to beta blocker and was given fluid bolus in which he was responsive to and SBP improved.   This morning pt was found to be in apparent respiratory distress with tachypnea , hypoxia with low o2 saturations into high 80's and increased muscle use to breath and lethargic which was a change in neuro exam from beginning of shift. Chest x-ray was obtained which showed pulmonary edema, bedside ultrasound was performed showing global curly B lines and abg was performed showing respiratory acidosis with PCo2 of 93. Vent settings were adjusted to increase tidal volume and resp rate in effort to lower PCo2 and pt was diuresed with Lasix which he did respond to by putting 1150 ml of urine.   After the change in vent settings and diuresis,  the patient did show improvement in respiratory status with less accessory muscles  use, repeat ABG showed decrease in PCo2 from 93 to 81.     Vital Signs Last 24 Hrs  T(C): 37.4, Max: 38.4 (03-18 @ 11:00)  T(F): 99.4, Max: 101.1 (03-18 @ 11:00)  HR: 133 (98 - 133)  BP: 105/74 (65/40 - 140/76)  BP(mean): 85 (48 - 99)  RR: 24 (20 - 42)  SpO2: 95% (91% - 98%)                        12.7   6.9   )-----------( 170      ( 18 Mar 2017 06:25 )             41.7       18 Mar 2017 06:25    143    |  96     |  51.0   ----------------------------<  185    5.0     |  41.0   |  0.76     Ca    8.5        18 Mar 2017 06:25  Phos  2.2       18 Mar 2017 06:25  Mg     3.1       18 Mar 2017 06:25        CAPILLARY BLOOD GLUCOSE      Mode: AC/ CMV (Assist Control/ Continuous Mandatory Ventilation)  RR (machine): 24  TV (machine): 500  FiO2: 50  PEEP: 10  MAP: 10  PIP: 36    MEDICATIONS  (STANDING):  tamsulosin 0.4milliGRAM(s) Oral at bedtime  levoFLOXacin IVPB 750milliGRAM(s) IV Intermittent every 24 hours  enoxaparin Injectable 40milliGRAM(s) SubCutaneous every 24 hours  ALBUTerol/ipratropium for Nebulization 3milliLiter(s) Nebulizer every 4 hours  ALBUTerol    0.083%. 15milliGRAM(s) Nebulizer once  chlorhexidine 0.12% Liquid 15milliLiter(s) Swish and Spit two times a day  pantoprazole  Injectable 40milliGRAM(s) IV Push daily  hydrocortisone  Injectable 50milliGRAM(s) IV Push every 6 hours  folic acid 1milliGRAM(s) Oral daily  thiamine 100milliGRAM(s) Oral daily  dexmedetomidine Infusion 0.4MICROgram(s)/kG/Hr IV Continuous <Continuous>  metoprolol Injectable 5milliGRAM(s) IV Push every 6 hours  furosemide   Injectable 40milliGRAM(s) IV Push once          Physical Exam:     Constitutional: resp distress   HEENT: PERRLA, EOMI, no drainage or redness  Neck: No bruits; no thyromegaly or nodules,  No JVD  Back: Normal spine flexure, No CVA tenderness, No deformity or limitation of movement  Respiratory: Breath Sounds diminished right greater than left to auscultation, definite use of  accessory muscle use with tachypnea  Cardiovascular: Regular rate & rhythm, normal S1, S2; no murmurs, gallops or rubs; no S3, S4  Gastrointestinal: Soft, non-tender, is distended/obese  normal bowel sounds  Extremities: No peripheral edema, No cyanosis, clubbing   Vascular: Equal and normal pulses: 2+ peripheral pulses throughout  Neurological: lethargic non verbal responds appropriatly to pain, opens eyes to verbal stimuli but not following commands    Musculoskeletal: No joint pain, swelling or deformity; no limitation of movement  Skin: No rashes

## 2017-03-20 DIAGNOSIS — G93.40 ENCEPHALOPATHY, UNSPECIFIED: ICD-10-CM

## 2017-03-20 DIAGNOSIS — Z51.5 ENCOUNTER FOR PALLIATIVE CARE: ICD-10-CM

## 2017-03-20 DIAGNOSIS — I48.91 UNSPECIFIED ATRIAL FIBRILLATION: ICD-10-CM

## 2017-03-20 DIAGNOSIS — J44.1 CHRONIC OBSTRUCTIVE PULMONARY DISEASE WITH (ACUTE) EXACERBATION: ICD-10-CM

## 2017-03-20 LAB
ALBUMIN SERPL ELPH-MCNC: 3.3 G/DL — SIGNIFICANT CHANGE UP (ref 3.3–5.2)
ALP SERPL-CCNC: 73 U/L — SIGNIFICANT CHANGE UP (ref 40–120)
ALT FLD-CCNC: 195 U/L — HIGH
ANION GAP SERPL CALC-SCNC: <5 MMOL/L — SIGNIFICANT CHANGE UP (ref 5–17)
AST SERPL-CCNC: 146 U/L — HIGH
BILIRUB SERPL-MCNC: 0.2 MG/DL — LOW (ref 0.4–2)
BUN SERPL-MCNC: 53 MG/DL — HIGH (ref 8–20)
CALCIUM SERPL-MCNC: 8.4 MG/DL — LOW (ref 8.6–10.2)
CHLORIDE SERPL-SCNC: 102 MMOL/L — SIGNIFICANT CHANGE UP (ref 98–107)
CO2 SERPL-SCNC: >43 MMOL/L — CRITICAL HIGH (ref 22–29)
CREAT SERPL-MCNC: 0.67 MG/DL — SIGNIFICANT CHANGE UP (ref 0.5–1.3)
CULTURE RESULTS: SIGNIFICANT CHANGE UP
CULTURE RESULTS: SIGNIFICANT CHANGE UP
GLUCOSE SERPL-MCNC: 184 MG/DL — HIGH (ref 70–115)
HCT VFR BLD CALC: 41.9 % — LOW (ref 42–52)
HGB BLD-MCNC: 12.3 G/DL — LOW (ref 14–18)
INR BLD: 1.27 RATIO — HIGH (ref 0.88–1.16)
MCHC RBC-ENTMCNC: 29.4 G/DL — LOW (ref 32–36)
MCHC RBC-ENTMCNC: 31.8 PG — HIGH (ref 27–31)
MCV RBC AUTO: 108.3 FL — HIGH (ref 80–94)
PLATELET # BLD AUTO: 244 K/UL — SIGNIFICANT CHANGE UP (ref 150–400)
POTASSIUM SERPL-MCNC: 3.8 MMOL/L — SIGNIFICANT CHANGE UP (ref 3.5–5.3)
POTASSIUM SERPL-SCNC: 3.8 MMOL/L — SIGNIFICANT CHANGE UP (ref 3.5–5.3)
PROT SERPL-MCNC: 6.3 G/DL — LOW (ref 6.6–8.7)
PROTHROM AB SERPL-ACNC: 14 SEC — HIGH (ref 10–13.1)
RBC # BLD: 3.87 M/UL — LOW (ref 4.6–6.2)
RBC # FLD: 13.8 % — SIGNIFICANT CHANGE UP (ref 11–15.6)
SODIUM SERPL-SCNC: 150 MMOL/L — HIGH (ref 135–145)
SPECIMEN SOURCE: SIGNIFICANT CHANGE UP
SPECIMEN SOURCE: SIGNIFICANT CHANGE UP
WBC # BLD: 8.6 K/UL — SIGNIFICANT CHANGE UP (ref 4.8–10.8)
WBC # FLD AUTO: 8.6 K/UL — SIGNIFICANT CHANGE UP (ref 4.8–10.8)

## 2017-03-20 PROCEDURE — 99223 1ST HOSP IP/OBS HIGH 75: CPT

## 2017-03-20 PROCEDURE — 99291 CRITICAL CARE FIRST HOUR: CPT

## 2017-03-20 RX ORDER — ESMOLOL HCL 100MG/10ML
50 VIAL (ML) INTRAVENOUS
Qty: 2500 | Refills: 0 | Status: DISCONTINUED | OUTPATIENT
Start: 2017-03-20 | End: 2017-03-22

## 2017-03-20 RX ORDER — QUETIAPINE FUMARATE 200 MG/1
25 TABLET, FILM COATED ORAL
Qty: 0 | Refills: 0 | Status: DISCONTINUED | OUTPATIENT
Start: 2017-03-20 | End: 2017-03-22

## 2017-03-20 RX ORDER — MIDODRINE HYDROCHLORIDE 2.5 MG/1
10 TABLET ORAL THREE TIMES A DAY
Qty: 0 | Refills: 0 | Status: DISCONTINUED | OUTPATIENT
Start: 2017-03-20 | End: 2017-03-22

## 2017-03-20 RX ORDER — DIGOXIN 250 MCG
0.5 TABLET ORAL ONCE
Qty: 0 | Refills: 0 | Status: COMPLETED | OUTPATIENT
Start: 2017-03-20 | End: 2017-03-20

## 2017-03-20 RX ORDER — FENTANYL CITRATE 50 UG/ML
12.5 INJECTION INTRAVENOUS EVERY 6 HOURS
Qty: 0 | Refills: 0 | Status: DISCONTINUED | OUTPATIENT
Start: 2017-03-20 | End: 2017-03-23

## 2017-03-20 RX ORDER — POLYETHYLENE GLYCOL 3350 17 G/17G
17 POWDER, FOR SOLUTION ORAL DAILY
Qty: 0 | Refills: 0 | Status: DISCONTINUED | OUTPATIENT
Start: 2017-03-20 | End: 2017-03-23

## 2017-03-20 RX ORDER — PANTOPRAZOLE SODIUM 20 MG/1
40 TABLET, DELAYED RELEASE ORAL DAILY
Qty: 0 | Refills: 0 | Status: DISCONTINUED | OUTPATIENT
Start: 2017-03-20 | End: 2017-03-23

## 2017-03-20 RX ADMIN — Medication 3 MILLILITER(S): at 19:43

## 2017-03-20 RX ADMIN — Medication 5 MILLIGRAM(S): at 12:16

## 2017-03-20 RX ADMIN — QUETIAPINE FUMARATE 25 MILLIGRAM(S): 200 TABLET, FILM COATED ORAL at 17:00

## 2017-03-20 RX ADMIN — FENTANYL CITRATE 50 MICROGRAM(S): 50 INJECTION INTRAVENOUS at 09:37

## 2017-03-20 RX ADMIN — POLYETHYLENE GLYCOL 3350 17 GRAM(S): 17 POWDER, FOR SOLUTION ORAL at 14:20

## 2017-03-20 RX ADMIN — Medication 50 MILLIGRAM(S): at 14:01

## 2017-03-20 RX ADMIN — ENOXAPARIN SODIUM 40 MILLIGRAM(S): 100 INJECTION SUBCUTANEOUS at 21:26

## 2017-03-20 RX ADMIN — Medication 3 MILLILITER(S): at 04:38

## 2017-03-20 RX ADMIN — QUETIAPINE FUMARATE 25 MILLIGRAM(S): 200 TABLET, FILM COATED ORAL at 14:19

## 2017-03-20 RX ADMIN — Medication 5 MILLIGRAM(S): at 06:09

## 2017-03-20 RX ADMIN — CHLORHEXIDINE GLUCONATE 15 MILLILITER(S): 213 SOLUTION TOPICAL at 06:09

## 2017-03-20 RX ADMIN — Medication 1 MILLIGRAM(S): at 12:16

## 2017-03-20 RX ADMIN — FENTANYL CITRATE 50 MICROGRAM(S): 50 INJECTION INTRAVENOUS at 10:17

## 2017-03-20 RX ADMIN — Medication 5 MILLIGRAM(S): at 17:00

## 2017-03-20 RX ADMIN — Medication 50 MILLIGRAM(S): at 21:27

## 2017-03-20 RX ADMIN — MIDODRINE HYDROCHLORIDE 10 MILLIGRAM(S): 2.5 TABLET ORAL at 21:26

## 2017-03-20 RX ADMIN — CHLORHEXIDINE GLUCONATE 15 MILLILITER(S): 213 SOLUTION TOPICAL at 17:01

## 2017-03-20 RX ADMIN — FENTANYL CITRATE 12.5 MICROGRAM(S): 50 INJECTION INTRAVENOUS at 15:43

## 2017-03-20 RX ADMIN — Medication 3 MILLILITER(S): at 08:27

## 2017-03-20 RX ADMIN — FENTANYL CITRATE 50 MICROGRAM(S): 50 INJECTION INTRAVENOUS at 01:16

## 2017-03-20 RX ADMIN — Medication 100 MILLIGRAM(S): at 14:00

## 2017-03-20 RX ADMIN — FENTANYL CITRATE 12.5 MICROGRAM(S): 50 INJECTION INTRAVENOUS at 15:22

## 2017-03-20 RX ADMIN — MIDODRINE HYDROCHLORIDE 10 MILLIGRAM(S): 2.5 TABLET ORAL at 14:00

## 2017-03-20 RX ADMIN — FENTANYL CITRATE 50 MICROGRAM(S): 50 INJECTION INTRAVENOUS at 01:45

## 2017-03-20 RX ADMIN — Medication 5 MILLIGRAM(S): at 23:48

## 2017-03-20 RX ADMIN — Medication 50 MILLIGRAM(S): at 06:10

## 2017-03-20 RX ADMIN — PANTOPRAZOLE SODIUM 40 MILLIGRAM(S): 20 TABLET, DELAYED RELEASE ORAL at 14:01

## 2017-03-20 RX ADMIN — Medication 29.94 MICROGRAM(S)/KG/MIN: at 16:59

## 2017-03-20 RX ADMIN — Medication 0.5 MILLIGRAM(S): at 15:50

## 2017-03-20 NOTE — PHYSICAL THERAPY INITIAL EVALUATION ADULT - PRECAUTIONS/LIMITATIONS, REHAB EVAL
oxygen therapy device and L/min/swallowing precautions/fall precautions/cardiac precautions/aspiration precautions

## 2017-03-20 NOTE — PHYSICAL THERAPY INITIAL EVALUATION ADULT - GENERAL OBSERVATIONS, REHAB EVAL
Pt received supine in bed (+)intubated on full vent support, (+)cardiac monitor, (+)IV, (+)bilateral mitts

## 2017-03-20 NOTE — PHYSICAL THERAPY INITIAL EVALUATION ADULT - IMPAIRMENTS FOUND, PT EVAL
muscle strength/Pt resistant to movement. Not following commands. Will put pt on trial for PT to determine appropriateness/arousal, attention, and cognition

## 2017-03-20 NOTE — CONSULT NOTE ADULT - ATTENDING COMMENTS
See intubation note for further details.  In short, this patient is critically ill with shock and respiratory failure -- I contacted his father in law, who is his emergency contact -- Velasquez Rizotae [phone]; apparently the patient's wife is , has no children, but does have a sister [place].  He recently went to an  and may have assigned a healthcare proxy -- Mr Dumas will investigate this.
Thank you for the opportunity to assist with the care of this patient.   [facility] [phone].

## 2017-03-20 NOTE — PROGRESS NOTE ADULT - SUBJECTIVE AND OBJECTIVE BOX
Patient is a 71y old  Male who presents with a chief complaint of     BRIEF HOSPITAL COURSE:  admitted with respiratory distress placed on NIV, found to have human metapneumonovirus, was moved to MICU for hypoxia and hypercapnia, intubated 3/16 for failed NIV, brief cardiac arrest with ROSC within 2-3 minutes and return of mental status.     Events last 24 hours: Off vasopressors, periods of afib with RVR on esmolol      PAST MEDICAL & SURGICAL HISTORY:  Gastric ulcer  BPH (Benign Prostatic Hyperplasia)  Gastric reflux  COPD (chronic obstructive pulmonary disease)  Anxiety disorder  HTN (hypertension)  S/P colonoscopy      Review of Systems:  unable to obtain      Medications:    tamsulosin 0.4milliGRAM(s) Oral at bedtime  metoprolol Injectable 5milliGRAM(s) IV Push every 6 hours  midodrine 10milliGRAM(s) Oral three times a day  esMOLOL  Infusion 50MICROgram(s)/kG/Min IV Continuous <Continuous>    ALBUTerol/ipratropium for Nebulization 3milliLiter(s) Nebulizer every 4 hours  ALBUTerol    0.083% 2.5milliGRAM(s) Nebulizer every 4 hours PRN  ALBUTerol    0.083%. 15milliGRAM(s) Nebulizer once    fentaNYL    Injectable 12.5MICROGram(s) IV Push every 6 hours PRN  QUEtiapine 25milliGRAM(s) Oral two times a day      enoxaparin Injectable 40milliGRAM(s) SubCutaneous every 24 hours    pantoprazole   Suspension 40milliGRAM(s) Oral daily  polyethylene glycol 3350 17Gram(s) Oral daily      hydrocortisone  Injectable 50milliGRAM(s) IV Push every 8 hours    folic acid 1milliGRAM(s) Oral daily  thiamine 100milliGRAM(s) Oral daily      chlorhexidine 0.12% Liquid 15milliLiter(s) Swish and Spit two times a day        Mode: AC/ CMV (Assist Control/ Continuous Mandatory Ventilation)  RR (machine): 24  TV (machine): 500  FiO2: 40  PEEP: 5  MAP: 11  PIP: 31      ICU Vital Signs Last 24 Hrs  T(C): 36.4, Max: 37.3 (03-20 @ 04:00)  T(F): 97.5, Max: 99.2 (03-20 @ 04:00)  HR: 100 (91 - 169)  BP: 126/75 (116/58 - 166/92)  BP(mean): 96 (79 - 125)  ABP: --  ABP(mean): --  RR: 24 (13 - 36)  SpO2: 97% (91% - 100%)      ABG - ( 19 Mar 2017 04:31 )  pH: 7.32  /  pCO2: 81    /  pO2: 84    / HCO3: 38    / Base Excess: 14.3  /  SaO2: 97                  I&O's Detail  I & Os for 24h ending 20 Mar 2017 07:00  =============================================  IN:    Jevity: 1440 ml    Solution: 1000 ml    Solution: 250 ml    Solution: 150 ml    Solution: 100 ml    norepinephrine Infusion: 69.7 ml    norepinephrine Infusion: 9.2 ml    Total IN: 3018.9 ml  ---------------------------------------------  OUT:    Incontinent per Condom Catheter: 2050 ml    Total OUT: 2050 ml  ---------------------------------------------  Total NET: 968.9 ml    I & Os for current day (as of 20 Mar 2017 20:46)  =============================================  IN:    Jevity: 780 ml    Free Water: 500 ml    Solution: 250 ml    esmolol Infusion: 58.8 ml    Total IN: 1588.8 ml  ---------------------------------------------  OUT:    Total OUT: 0 ml  ---------------------------------------------  Total NET: 1588.8 ml        LABS:                        12.3   8.6   )-----------( 244      ( 20 Mar 2017 06:59 )             41.9     20 Mar 2017 06:59    150    |  102    |  53.0   ----------------------------<  184    3.8     |  >43.0  |  0.67     Ca    8.4        20 Mar 2017 06:59  Phos  1.3       19 Mar 2017 05:56  Mg     3.0       19 Mar 2017 05:56    TPro  6.3    /  Alb  3.3    /  TBili  0.2    /  DBili  x      /  AST  146    /  ALT  195    /  AlkPhos  73     20 Mar 2017 06:59          CAPILLARY BLOOD GLUCOSE  165 (20 Mar 2017 17:00)    PT/INR - ( 20 Mar 2017 06:59 )   PT: 14.0 sec;   INR: 1.27 ratio             CULTURES:  Culture Results:   No routine respiratory gay Isolated (03-17 @ 15:04)  Culture Results:   No growth (03-16 @ 17:19)  Rapid RVP Result: Detected (03-15 @ 21:37)  Culture Results:   No growth at 5 days. (03-15 @ 14:03)  Culture Results:   No growth at 5 days. (03-15 @ 14:03)      Physical Examination:    General: No acute distress.  intubated with periods of agigation    HEENT: Pupils equal, reactive to light.  Symmetric.    PULM: Course no wheeze    CVS: afib    ABD: Soft, nondistended, nontender, normoactive bowel sounds, no masses    EXT: No edema, nontender    SKIN: Warm and well perfused, no rashes noted.        CRITICAL CARE TIME SPENT:40

## 2017-03-20 NOTE — CONSULT NOTE ADULT - PROBLEM SELECTOR RECOMMENDATION 4
Unclear; could be due to increased intrathoracic pressure from autoPEEPing causing cardiovascular collapse. however, STAT echo revealed not much LV or RV dysfunction.  Was on tapering steroids in February -- will start "stress dose" steroids empirically.  Norepinephrine infusion to support hemodynamics and perfusion.  Cycle troponins.
-oxygen dependent, very poor overall prognosis for getting off the ventilator

## 2017-03-20 NOTE — PHYSICAL THERAPY INITIAL EVALUATION ADULT - PERTINENT HX OF CURRENT PROBLEM, REHAB EVAL
72 yo male adm with respiratory distress, found to have human metapneumonovirus, was moved to MICU for hypoxia and hypercapnia, intubated 3/16 for failed NIV, brief cardiac arrest with ROSC within 2-3 minutes and return of mental status. Shock state requiring intermittent norepinephrine.

## 2017-03-20 NOTE — PROGRESS NOTE ADULT - PROBLEM SELECTOR PLAN 6
as above.  - failed SBT 3/17, 3/18, 3/19 secondary to Vt < 200, HR > 120, RR > 40, and SpO2 < 90. Tolerated approximately 5 minutes
as above.  - failed SBT 3/18 secondary to Vt < 200, HR > 120, RR > 40, and SpO2 < 90. Tolerated < 5 minutes
secondary to illness, seroquel decrease benzos
Starting folate and thiamine.

## 2017-03-20 NOTE — CONSULT NOTE ADULT - SUBJECTIVE AND OBJECTIVE BOX
HPI: 71M with PMH as listed admitted 3/15 with shortness of breath, developed worsening respiratory failure,, failed bipap, post cardiac arrest, intubation, now critically ill in MICU.     PERTINENT PMH REVIEWED: Yes     PAST MEDICAL & SURGICAL HISTORY:  Gastric ulcer  BPH (Benign Prostatic Hyperplasia)  Gastric reflux  COPD (chronic obstructive pulmonary disease)  Anxiety disorder  HTN (hypertension)  S/P colonoscopy    SOCIAL HISTORY:                                     Admitted from:  home  SNF  [facility]     Surrogate/HCP/Guardian: Phone#:    FAMILY HISTORY:  No pertinent family history in first degree relatives      Baseline ADLs (prior to admission):  Independent/ Dependent      Allergies    No Known Allergies    Intolerances        Present Symptoms:     Dyspnea: 0 1 2 3   Nausea/Vomiting: Yes No  Anxiety:  Yes No  Depression: Yes No  Fatigue: Yes No  Loss of appetite: Yes No    Pain:             Character-            Duration-            Effect-            Factors-            Frequency-            Location-            Severity-    Review of Systems: Reviewed                     Negative:                     Positive:  Unable to obtain due to poor mentation   All others negative    MEDICATIONS  (STANDING):  tamsulosin 0.4milliGRAM(s) Oral at bedtime  enoxaparin Injectable 40milliGRAM(s) SubCutaneous every 24 hours  ALBUTerol/ipratropium for Nebulization 3milliLiter(s) Nebulizer every 4 hours  ALBUTerol    0.083%. 15milliGRAM(s) Nebulizer once  chlorhexidine 0.12% Liquid 15milliLiter(s) Swish and Spit two times a day  folic acid 1milliGRAM(s) Oral daily  thiamine 100milliGRAM(s) Oral daily  metoprolol Injectable 5milliGRAM(s) IV Push every 6 hours  hydrocortisone  Injectable 50milliGRAM(s) IV Push every 8 hours  pantoprazole   Suspension 40milliGRAM(s) Oral daily  midodrine 10milliGRAM(s) Oral three times a day  polyethylene glycol 3350 17Gram(s) Oral daily  QUEtiapine 25milliGRAM(s) Oral two times a day    MEDICATIONS  (PRN):  ALBUTerol    0.083% 2.5milliGRAM(s) Nebulizer every 4 hours PRN Shortness of Breath and/or Wheezing  fentaNYL    Injectable 12.5MICROGram(s) IV Push every 6 hours PRN Mild Pain (1 - 3)      PHYSICAL EXAM:    Vital Signs Last 24 Hrs  T(C): 36.5, Max: 37.3 (03-20 @ 04:00)  T(F): 97.7, Max: 99.2 (03-20 @ 04:00)  HR: 111 (106 - 132)  BP: 139/72 (80/52 - 166/92)  BP(mean): 100 (61 - 123)  RR: 35 (13 - 36)  SpO2: 96% (91% - 100%)    General: alert  oriented x ____ lethargic agitated                  cachexia  nonverbal  coma    Karnofsky:  %    HEENT: normal  dry mouth  ET tube/trach    Lungs: comfortable tachypnea/labored breathing  excessive secretions    CV: normal  tachycardia    GI: normal  distended  tender  no BS               PEG/NG/OG tube  constipation  last BM:     : normal  incontinent  oliguria/anuria  henao    MSK: normal  weakness  edema             ambulatory  bedbound/wheelchair bound    Skin: normal  pressure ulcers- Stage_____  no rash    LABS:                        12.3   8.6   )-----------( 244      ( 20 Mar 2017 06:59 )             41.9     20 Mar 2017 06:59    150    |  102    |  53.0   ----------------------------<  184    3.8     |  >43.0  |  0.67     Ca    8.4        20 Mar 2017 06:59  Phos  1.3       19 Mar 2017 05:56  Mg     3.0       19 Mar 2017 05:56    TPro  6.3    /  Alb  3.3    /  TBili  0.2    /  DBili  x      /  AST  146    /  ALT  195    /  AlkPhos  73     20 Mar 2017 06:59    PT/INR - ( 20 Mar 2017 06:59 )   PT: 14.0 sec;   INR: 1.27 ratio             I&O's Summary  I & Os for 24h ending 20 Mar 2017 07:00  =============================================  IN: 3018.9 ml / OUT: 2050 ml / NET: 968.9 ml    I & Os for current day (as of 20 Mar 2017 14:27)  =============================================  IN: 730 ml / OUT: 0 ml / NET: 730 ml      RADIOLOGY & ADDITIONAL STUDIES:    ADVANCE DIRECTIVES:   DNR YES NO  Completed on:                     MOLST  YES NO   Completed on:  Living Will  YES NO   Completed on: HPI: 71M with PMH as listed admitted 3/15 with shortness of breath, developed worsening respiratory failure,, failed bipap, post cardiac arrest, intubation, now critically ill in MICU.     PERTINENT PMH REVIEWED: Yes     PAST MEDICAL & SURGICAL HISTORY:  Gastric ulcer  BPH (Benign Prostatic Hyperplasia)  Gastric reflux  COPD (chronic obstructive pulmonary disease)  Anxiety disorder  HTN (hypertension)  S/P colonoscopy    SOCIAL HISTORY:                                     Admitted from:  home    HCP - 1st spouse - Milagros -             2nd - brother in law - Velasquez Andrews - [phone]    FAMILY HISTORY:  No pertinent family history in first degree relatives    Baseline ADLs (prior to admission):  Independent/ Dependent      Allergies    No Known Allergies    Present Symptoms:     Dyspnea: 0 1 2 3   Nausea/Vomiting: Yes No  Anxiety:  Yes No  Depression: Yes No  Fatigue: Yes No  Loss of appetite: Yes No    Pain:             Character-            Duration-            Effect-            Factors-            Frequency-            Location-            Severity-    Review of Systems: Reviewed                     Negative:                     Positive:  Unable to obtain due to poor mentation   All others negative    MEDICATIONS  (STANDING):  tamsulosin 0.4milliGRAM(s) Oral at bedtime  enoxaparin Injectable 40milliGRAM(s) SubCutaneous every 24 hours  ALBUTerol/ipratropium for Nebulization 3milliLiter(s) Nebulizer every 4 hours  ALBUTerol    0.083%. 15milliGRAM(s) Nebulizer once  chlorhexidine 0.12% Liquid 15milliLiter(s) Swish and Spit two times a day  folic acid 1milliGRAM(s) Oral daily  thiamine 100milliGRAM(s) Oral daily  metoprolol Injectable 5milliGRAM(s) IV Push every 6 hours  hydrocortisone  Injectable 50milliGRAM(s) IV Push every 8 hours  pantoprazole   Suspension 40milliGRAM(s) Oral daily  midodrine 10milliGRAM(s) Oral three times a day  polyethylene glycol 3350 17Gram(s) Oral daily  QUEtiapine 25milliGRAM(s) Oral two times a day    MEDICATIONS  (PRN):  ALBUTerol    0.083% 2.5milliGRAM(s) Nebulizer every 4 hours PRN Shortness of Breath and/or Wheezing  fentaNYL    Injectable 12.5MICROGram(s) IV Push every 6 hours PRN Mild Pain (1 - 3)      PHYSICAL EXAM:    Vital Signs Last 24 Hrs  T(C): 36.5, Max: 37.3 ( @ 04:00)  T(F): 97.7, Max: 99.2 ( @ 04:00)  HR: 111 (106 - 132)  BP: 139/72 (80/52 - 166/92)  BP(mean): 100 (61 - 123)  RR: 35 (13 - 36)  SpO2: 96% (91% - 100%)    General: alert  oriented x ____ lethargic agitated                  cachexia  nonverbal  coma    Karnofsky:  %    HEENT: normal  dry mouth  ET tube/trach    Lungs: comfortable tachypnea/labored breathing  excessive secretions    CV: normal  tachycardia    GI: normal  distended  tender  no BS               PEG/NG/OG tube  constipation  last BM:     : normal  incontinent  oliguria/anuria  henao    MSK: normal  weakness  edema             ambulatory  bedbound/wheelchair bound    Skin: normal  pressure ulcers- Stage_____  no rash    LABS:                        12.3   8.6   )-----------( 244      ( 20 Mar 2017 06:59 )             41.9     20 Mar 2017 06:59    150    |  102    |  53.0   ----------------------------<  184    3.8     |  >43.0  |  0.67     Ca    8.4        20 Mar 2017 06:59  Phos  1.3       19 Mar 2017 05:56  Mg     3.0       19 Mar 2017 05:56    TPro  6.3    /  Alb  3.3    /  TBili  0.2    /  DBili  x      /  AST  146    /  ALT  195    /  AlkPhos  73     20 Mar 2017 06:59    PT/INR - ( 20 Mar 2017 06:59 )   PT: 14.0 sec;   INR: 1.27 ratio             I&O's Summary  I & Os for 24h ending 20 Mar 2017 07:00  =============================================  IN: 3018.9 ml / OUT: 2050 ml / NET: 968.9 ml    I & Os for current day (as of 20 Mar 2017 14:27)  =============================================  IN: 730 ml / OUT: 0 ml / NET: 730 ml      RADIOLOGY & ADDITIONAL STUDIES:    ADVANCE DIRECTIVES:   DNR YES NO  Completed on:                     MOLST  YES NO   Completed on:  Living Will  YES NO   Completed on: HPI: 71M with PMH as listed admitted 3/15 with shortness of breath, developed worsening respiratory failure,, failed bipap, post cardiac arrest, intubation, now critically ill in MICU.     PERTINENT PMH REVIEWED: Yes     PAST MEDICAL & SURGICAL HISTORY:  Gastric ulcer  BPH (Benign Prostatic Hyperplasia)  Gastric reflux  COPD (chronic obstructive pulmonary disease), oxygen dependent  Anxiety disorder  HTN (hypertension)  S/P colonoscopy    SOCIAL HISTORY:  nonsmoker currently                                   Admitted from:  home    HCP - 1st spouse - Milagros -             2nd - brother in law - Velasquez Andrews - [phone]    FAMILY HISTORY:  No pertinent family history in first degree relatives    Baseline ADLs (prior to admission):  Independent    Allergies    No Known Allergies    Present Symptoms:     Dyspnea: respiratory failure   Nausea/Vomiting: No  Anxiety:  Yes  Depression: No  Fatigue: No  Loss of appetite: No    Pain: none currently    Review of Systems: Reviewed                Unable to obtain due to poor mentation     MEDICATIONS  (STANDING):  tamsulosin 0.4milliGRAM(s) Oral at bedtime  enoxaparin Injectable 40milliGRAM(s) SubCutaneous every 24 hours  ALBUTerol/ipratropium for Nebulization 3milliLiter(s) Nebulizer every 4 hours  ALBUTerol    0.083%. 15milliGRAM(s) Nebulizer once  chlorhexidine 0.12% Liquid 15milliLiter(s) Swish and Spit two times a day  folic acid 1milliGRAM(s) Oral daily  thiamine 100milliGRAM(s) Oral daily  metoprolol Injectable 5milliGRAM(s) IV Push every 6 hours  hydrocortisone  Injectable 50milliGRAM(s) IV Push every 8 hours  pantoprazole   Suspension 40milliGRAM(s) Oral daily  midodrine 10milliGRAM(s) Oral three times a day  polyethylene glycol 3350 17Gram(s) Oral daily  QUEtiapine 25milliGRAM(s) Oral two times a day    MEDICATIONS  (PRN):  ALBUTerol    0.083% 2.5milliGRAM(s) Nebulizer every 4 hours PRN Shortness of Breath and/or Wheezing  fentaNYL    Injectable 12.5MICROGram(s) IV Push every 6 hours PRN Mild Pain (1 - 3)    PHYSICAL EXAM:    Vital Signs Last 24 Hrs  T(C): 36.5, Max: 37.3 (03-20 @ 04:00)  T(F): 97.7, Max: 99.2 (- @ 04:00)  HR: 111 (106 - 132)  BP: 139/72 (80/52 - 166/92)  BP(mean): 100 (61 - 123)  RR: 35 (13 - 36)  SpO2: 96% (91% - 100%)    General: lethargic    Karnofsky:  10%    HEENT: ET tube    Lungs:  tachypnea/labored breathing     CV: tachycardia    GI: nondistended    : henao    MSK: weakness      Skin: no rash    LABS:                        12.3   8.6   )-----------( 244      ( 20 Mar 2017 06:59 )             41.9     20 Mar 2017 06:59    150    |  102    |  53.0   ----------------------------<  184    3.8     |  >43.0  |  0.67     Ca    8.4        20 Mar 2017 06:59  Phos  1.3       19 Mar 2017 05:56  Mg     3.0       19 Mar 2017 05:56    TPro  6.3    /  Alb  3.3    /  TBili  0.2    /  DBili  x      /  AST  146    /  ALT  195    /  AlkPhos  73     20 Mar 2017 06:59    PT/INR - ( 20 Mar 2017 06:59 )   PT: 14.0 sec;   INR: 1.27 ratio       I&O's Summary  I & Os for 24h ending 20 Mar 2017 07:00  =============================================  IN: 3018.9 ml / OUT: 2050 ml / NET: 968.9 ml    I & Os for current day (as of 20 Mar 2017 14:27)  =============================================  IN: 730 ml / OUT: 0 ml / NET: 730 ml    RADIOLOGY & ADDITIONAL STUDIES:    CXR:  COPD changes. Possible small left lower lobe infiltrate.     ADVANCE DIRECTIVES: living will on chart. After this consult DNR

## 2017-03-20 NOTE — PROGRESS NOTE ADULT - PROBLEM SELECTOR PROBLEM 6
Respiratory failure requiring intubation
Encephalopathy acute
Respiratory failure requiring intubation
Macrocytic anemia

## 2017-03-20 NOTE — CONSULT NOTE ADULT - PROBLEM SELECTOR RECOMMENDATION 9
As above.  Steroids, bronchodilators (giving continuous now given level of autoPEEP)
-day 4 of intubation. aggressively wean off ventilator, if unable per living will, and HCP brother in law Velasquez, he would not want long term ventilator

## 2017-03-20 NOTE — CONSULT NOTE ADULT - ASSESSMENT
A  AECOPD - Gold's IV  Possible NIALL    P  DuoNeb  02  Steroid  Bipap
71M with advanced oxygen dependent COPD, post acute hypoxic respiratory failure, brief PEA arrest, now critically ill in MICU.
71 year old with acute respiratory failure from metapneumovirus and COPD exacerbation

## 2017-03-21 DIAGNOSIS — R41.82 ALTERED MENTAL STATUS, UNSPECIFIED: ICD-10-CM

## 2017-03-21 DIAGNOSIS — I48.1 PERSISTENT ATRIAL FIBRILLATION: ICD-10-CM

## 2017-03-21 LAB
ALBUMIN SERPL ELPH-MCNC: 3.2 G/DL — LOW (ref 3.3–5.2)
ALP SERPL-CCNC: 76 U/L — SIGNIFICANT CHANGE UP (ref 40–120)
ALT FLD-CCNC: 265 U/L — HIGH
ANION GAP SERPL CALC-SCNC: <6 MMOL/L — SIGNIFICANT CHANGE UP (ref 5–17)
APTT BLD: 29.6 SEC — SIGNIFICANT CHANGE UP (ref 27.5–37.4)
AST SERPL-CCNC: 153 U/L — HIGH
BILIRUB SERPL-MCNC: 0.3 MG/DL — LOW (ref 0.4–2)
BUN SERPL-MCNC: 48 MG/DL — HIGH (ref 8–20)
CALCIUM SERPL-MCNC: 8.6 MG/DL — SIGNIFICANT CHANGE UP (ref 8.6–10.2)
CHLORIDE SERPL-SCNC: 104 MMOL/L — SIGNIFICANT CHANGE UP (ref 98–107)
CO2 SERPL-SCNC: >43 MMOL/L — CRITICAL HIGH (ref 22–29)
CREAT SERPL-MCNC: 0.58 MG/DL — SIGNIFICANT CHANGE UP (ref 0.5–1.3)
GLUCOSE SERPL-MCNC: 194 MG/DL — HIGH (ref 70–115)
HCT VFR BLD CALC: 41.8 % — LOW (ref 42–52)
HGB BLD-MCNC: 12.4 G/DL — LOW (ref 14–18)
INR BLD: 1.17 RATIO — HIGH (ref 0.88–1.16)
MAGNESIUM SERPL-MCNC: 3 MG/DL — HIGH (ref 1.8–2.5)
MCHC RBC-ENTMCNC: 29.7 G/DL — LOW (ref 32–36)
MCHC RBC-ENTMCNC: 32.2 PG — HIGH (ref 27–31)
MCV RBC AUTO: 108.6 FL — HIGH (ref 80–94)
PHOSPHATE SERPL-MCNC: 2.3 MG/DL — LOW (ref 2.4–4.7)
PLATELET # BLD AUTO: 317 K/UL — SIGNIFICANT CHANGE UP (ref 150–400)
POTASSIUM SERPL-MCNC: 4.4 MMOL/L — SIGNIFICANT CHANGE UP (ref 3.5–5.3)
POTASSIUM SERPL-SCNC: 4.4 MMOL/L — SIGNIFICANT CHANGE UP (ref 3.5–5.3)
PROT SERPL-MCNC: 6.7 G/DL — SIGNIFICANT CHANGE UP (ref 6.6–8.7)
PROTHROM AB SERPL-ACNC: 12.9 SEC — SIGNIFICANT CHANGE UP (ref 10–13.1)
RBC # BLD: 3.85 M/UL — LOW (ref 4.6–6.2)
RBC # FLD: 13.5 % — SIGNIFICANT CHANGE UP (ref 11–15.6)
SODIUM SERPL-SCNC: 153 MMOL/L — HIGH (ref 135–145)
WBC # BLD: 12.5 K/UL — HIGH (ref 4.8–10.8)
WBC # FLD AUTO: 12.5 K/UL — HIGH (ref 4.8–10.8)

## 2017-03-21 PROCEDURE — 99233 SBSQ HOSP IP/OBS HIGH 50: CPT

## 2017-03-21 PROCEDURE — 99291 CRITICAL CARE FIRST HOUR: CPT

## 2017-03-21 RX ORDER — ACETAZOLAMIDE 250 MG/1
500 TABLET ORAL ONCE
Qty: 0 | Refills: 0 | Status: COMPLETED | OUTPATIENT
Start: 2017-03-21 | End: 2017-03-21

## 2017-03-21 RX ORDER — HYDROMORPHONE HYDROCHLORIDE 2 MG/ML
0.5 INJECTION INTRAMUSCULAR; INTRAVENOUS; SUBCUTANEOUS ONCE
Qty: 0 | Refills: 0 | Status: DISCONTINUED | OUTPATIENT
Start: 2017-03-21 | End: 2017-03-21

## 2017-03-21 RX ADMIN — Medication 3 MILLILITER(S): at 15:30

## 2017-03-21 RX ADMIN — Medication 5 MILLIGRAM(S): at 06:30

## 2017-03-21 RX ADMIN — Medication 40 MILLIGRAM(S): at 15:11

## 2017-03-21 RX ADMIN — Medication 29.94 MICROGRAM(S)/KG/MIN: at 15:22

## 2017-03-21 RX ADMIN — PANTOPRAZOLE SODIUM 40 MILLIGRAM(S): 20 TABLET, DELAYED RELEASE ORAL at 12:15

## 2017-03-21 RX ADMIN — MIDODRINE HYDROCHLORIDE 10 MILLIGRAM(S): 2.5 TABLET ORAL at 06:30

## 2017-03-21 RX ADMIN — Medication 50 MILLIGRAM(S): at 06:31

## 2017-03-21 RX ADMIN — Medication 3 MILLILITER(S): at 00:14

## 2017-03-21 RX ADMIN — Medication 29.94 MICROGRAM(S)/KG/MIN: at 23:23

## 2017-03-21 RX ADMIN — ENOXAPARIN SODIUM 40 MILLIGRAM(S): 100 INJECTION SUBCUTANEOUS at 23:23

## 2017-03-21 RX ADMIN — QUETIAPINE FUMARATE 25 MILLIGRAM(S): 200 TABLET, FILM COATED ORAL at 06:31

## 2017-03-21 RX ADMIN — Medication 3 MILLILITER(S): at 07:38

## 2017-03-21 RX ADMIN — ACETAZOLAMIDE 110 MILLIGRAM(S): 250 TABLET ORAL at 14:08

## 2017-03-21 RX ADMIN — CHLORHEXIDINE GLUCONATE 15 MILLILITER(S): 213 SOLUTION TOPICAL at 06:31

## 2017-03-21 RX ADMIN — Medication 1 MILLIGRAM(S): at 12:15

## 2017-03-21 RX ADMIN — MIDODRINE HYDROCHLORIDE 10 MILLIGRAM(S): 2.5 TABLET ORAL at 23:23

## 2017-03-21 RX ADMIN — Medication 3 MILLILITER(S): at 12:30

## 2017-03-21 RX ADMIN — MIDODRINE HYDROCHLORIDE 10 MILLIGRAM(S): 2.5 TABLET ORAL at 15:11

## 2017-03-21 RX ADMIN — HYDROMORPHONE HYDROCHLORIDE 0.5 MILLIGRAM(S): 2 INJECTION INTRAMUSCULAR; INTRAVENOUS; SUBCUTANEOUS at 23:23

## 2017-03-21 RX ADMIN — Medication 3 MILLILITER(S): at 20:22

## 2017-03-21 RX ADMIN — Medication 40 MILLIGRAM(S): at 23:23

## 2017-03-21 RX ADMIN — QUETIAPINE FUMARATE 25 MILLIGRAM(S): 200 TABLET, FILM COATED ORAL at 18:31

## 2017-03-21 RX ADMIN — Medication 100 MILLIGRAM(S): at 12:15

## 2017-03-21 RX ADMIN — CHLORHEXIDINE GLUCONATE 15 MILLILITER(S): 213 SOLUTION TOPICAL at 18:31

## 2017-03-21 RX ADMIN — Medication 3 MILLILITER(S): at 03:13

## 2017-03-21 RX ADMIN — POLYETHYLENE GLYCOL 3350 17 GRAM(S): 17 POWDER, FOR SOLUTION ORAL at 12:15

## 2017-03-21 NOTE — PROGRESS NOTE ADULT - ASSESSMENT
71M with advanced oxygen dependent COPD, post acute hypoxic respiratory failure, brief PEA arrest, still ventilator dependent.

## 2017-03-21 NOTE — PROGRESS NOTE ADULT - ATTENDING COMMENTS
Thank you for the opportunity to assist with the care of this patient.   [facility] [phone].

## 2017-03-21 NOTE — PROGRESS NOTE ADULT - SUBJECTIVE AND OBJECTIVE BOX
OVERNIGHT EVENTS: still vent dependent    Present Symptoms:     Dyspnea: 0 1 2 3   Nausea/Vomiting: Yes No  Anxiety:  Yes No  Depression: Yes No  Fatigue: Yes No  Loss of appetite: Yes No    Pain:             Character-            Duration-            Effect-            Factors-            Frequency-            Location-            Severity-    Review of Systems: Reviewed                     Negative:                     Positive:  Unable to obtain due to poor mentation   All others negative    MEDICATIONS  (STANDING):  tamsulosin 0.4milliGRAM(s) Oral at bedtime  enoxaparin Injectable 40milliGRAM(s) SubCutaneous every 24 hours  ALBUTerol/ipratropium for Nebulization 3milliLiter(s) Nebulizer every 4 hours  ALBUTerol    0.083%. 15milliGRAM(s) Nebulizer once  chlorhexidine 0.12% Liquid 15milliLiter(s) Swish and Spit two times a day  folic acid 1milliGRAM(s) Oral daily  thiamine 100milliGRAM(s) Oral daily  pantoprazole   Suspension 40milliGRAM(s) Oral daily  midodrine 10milliGRAM(s) Oral three times a day  polyethylene glycol 3350 17Gram(s) Oral daily  QUEtiapine 25milliGRAM(s) Oral two times a day  esMOLOL  Infusion 50MICROgram(s)/kG/Min IV Continuous <Continuous>  acetazolamide  IVPB 500milliGRAM(s) IV Intermittent once  methylPREDNISolone sodium succinate Injectable 40milliGRAM(s) IV Push every 8 hours    MEDICATIONS  (PRN):  ALBUTerol    0.083% 2.5milliGRAM(s) Nebulizer every 4 hours PRN Shortness of Breath and/or Wheezing  fentaNYL    Injectable 12.5MICROGram(s) IV Push every 6 hours PRN Mild Pain (1 - 3)    PHYSICAL EXAM:    Vital Signs Last 24 Hrs  T(C): 37.4, Max: 37.4 (03-21 @ 08:00)  T(F): 99.3, Max: 99.3 (03-21 @ 08:00)  HR: 84 (84 - 169)  BP: 128/97 (116/58 - 155/68)  BP(mean): 105 (79 - 125)  RR: 42 (24 - 48)  SpO2: 76% (76% - 100%)    General: alert  oriented x ____ lethargic agitated                  cachexia  nonverbal  coma    Karnofsky:  %    HEENT: normal  dry mouth  ET tube/trach    Lungs: comfortable tachypnea/labored breathing  excessive secretions    CV: normal  tachycardia    GI: normal  distended  tender  no BS               PEG/NG/OG tube  constipation  last BM:     : normal  incontinent  oliguria/anuria  henao    MSK: normal  weakness  edema             ambulatory  bedbound/wheelchair bound    Skin: normal  pressure ulcers- Stage_____  no rash    LABS:                          12.4   12.5  )-----------( 317      ( 21 Mar 2017 06:53 )             41.8     21 Mar 2017 06:53    153    |  104    |  48.0   ----------------------------<  194    4.4     |  >43.0  |  0.58     Ca    8.6        21 Mar 2017 06:53  Phos  2.3       21 Mar 2017 06:53  Mg     3.0       21 Mar 2017 06:53    TPro  6.7    /  Alb  3.2    /  TBili  0.3    /  DBili  x      /  AST  153    /  ALT  265    /  AlkPhos  76     21 Mar 2017 06:53    PT/INR - ( 21 Mar 2017 06:53 )   PT: 12.9 sec;   INR: 1.17 ratio      PTT - ( 21 Mar 2017 06:53 )  PTT:29.6 sec    I&O's Summary  I & Os for 24h ending 21 Mar 2017 07:00  =============================================  IN: 2914.1 ml / OUT: 1 ml / NET: 2913.1 ml    I & Os for current day (as of 21 Mar 2017 11:41)  =============================================  IN: 180 ml / OUT: 1 ml / NET: 179 ml      RADIOLOGY & ADDITIONAL STUDIES:    ADVANCE DIRECTIVES:   DNR YES NO  Completed on:                     MOLST  YES NO   Completed on:  Living Will  YES NO   Completed on: OVERNIGHT EVENTS: still vent dependent    Present Symptoms:     Dyspnea:  2   Nausea/Vomiting: No  Anxiety:  No  Depression: No  Fatigue: No  Loss of appetite: No    Pain: none    Review of Systems: Reviewed           Unable to obtain due to poor mentation     MEDICATIONS  (STANDING):  tamsulosin 0.4milliGRAM(s) Oral at bedtime  enoxaparin Injectable 40milliGRAM(s) SubCutaneous every 24 hours  ALBUTerol/ipratropium for Nebulization 3milliLiter(s) Nebulizer every 4 hours  ALBUTerol    0.083%. 15milliGRAM(s) Nebulizer once  chlorhexidine 0.12% Liquid 15milliLiter(s) Swish and Spit two times a day  folic acid 1milliGRAM(s) Oral daily  thiamine 100milliGRAM(s) Oral daily  pantoprazole   Suspension 40milliGRAM(s) Oral daily  midodrine 10milliGRAM(s) Oral three times a day  polyethylene glycol 3350 17Gram(s) Oral daily  QUEtiapine 25milliGRAM(s) Oral two times a day  esMOLOL  Infusion 50MICROgram(s)/kG/Min IV Continuous <Continuous>  acetazolamide  IVPB 500milliGRAM(s) IV Intermittent once  methylPREDNISolone sodium succinate Injectable 40milliGRAM(s) IV Push every 8 hours    MEDICATIONS  (PRN):  ALBUTerol    0.083% 2.5milliGRAM(s) Nebulizer every 4 hours PRN Shortness of Breath and/or Wheezing  fentaNYL    Injectable 12.5MICROGram(s) IV Push every 6 hours PRN Mild Pain (1 - 3)    PHYSICAL EXAM:    Vital Signs Last 24 Hrs  T(C): 37.4, Max: 37.4 (03-21 @ 08:00)  T(F): 99.3, Max: 99.3 (03-21 @ 08:00)  HR: 84 (84 - 169)  BP: 128/97 (116/58 - 155/68)  BP(mean): 105 (79 - 125)  RR: 42 (24 - 48)  SpO2: 76% (76% - 100%)    General: lethargic                     Karnofsky:  10%    HEENT: normal  dry mouth     Lungs: tachypnea/labored breathing      CV: normal  tachycardia    GI: normal  distended  tender  no BS               PEG/NG/OG tube  constipation  last BM:     : normal  incontinent  oliguria/anuria  henao    MSK: normal  weakness  edema             ambulatory  bedbound/wheelchair bound    Skin: normal  pressure ulcers- Stage_____  no rash    LABS:                          12.4   12.5  )-----------( 317      ( 21 Mar 2017 06:53 )             41.8     21 Mar 2017 06:53    153    |  104    |  48.0   ----------------------------<  194    4.4     |  >43.0  |  0.58     Ca    8.6        21 Mar 2017 06:53  Phos  2.3       21 Mar 2017 06:53  Mg     3.0       21 Mar 2017 06:53    TPro  6.7    /  Alb  3.2    /  TBili  0.3    /  DBili  x      /  AST  153    /  ALT  265    /  AlkPhos  76     21 Mar 2017 06:53    PT/INR - ( 21 Mar 2017 06:53 )   PT: 12.9 sec;   INR: 1.17 ratio      PTT - ( 21 Mar 2017 06:53 )  PTT:29.6 sec    I&O's Summary  I & Os for 24h ending 21 Mar 2017 07:00  =============================================  IN: 2914.1 ml / OUT: 1 ml / NET: 2913.1 ml    I & Os for current day (as of 21 Mar 2017 11:41)  =============================================  IN: 180 ml / OUT: 1 ml / NET: 179 ml      RADIOLOGY & ADDITIONAL STUDIES:    ADVANCE DIRECTIVES:   DNR YES NO  Completed on:                     MOLST  YES NO   Completed on:  Living Will  YES NO   Completed on: OVERNIGHT EVENTS: still vent dependent    Present Symptoms:     Dyspnea:  2   Nausea/Vomiting: No  Anxiety:  No  Depression: No  Fatigue: No  Loss of appetite: No    Pain: none    Review of Systems: Reviewed           Unable to obtain due to poor mentation     MEDICATIONS  (STANDING):  tamsulosin 0.4milliGRAM(s) Oral at bedtime  enoxaparin Injectable 40milliGRAM(s) SubCutaneous every 24 hours  ALBUTerol/ipratropium for Nebulization 3milliLiter(s) Nebulizer every 4 hours  ALBUTerol    0.083%. 15milliGRAM(s) Nebulizer once  chlorhexidine 0.12% Liquid 15milliLiter(s) Swish and Spit two times a day  folic acid 1milliGRAM(s) Oral daily  thiamine 100milliGRAM(s) Oral daily  pantoprazole   Suspension 40milliGRAM(s) Oral daily  midodrine 10milliGRAM(s) Oral three times a day  polyethylene glycol 3350 17Gram(s) Oral daily  QUEtiapine 25milliGRAM(s) Oral two times a day  esMOLOL  Infusion 50MICROgram(s)/kG/Min IV Continuous <Continuous>  acetazolamide  IVPB 500milliGRAM(s) IV Intermittent once  methylPREDNISolone sodium succinate Injectable 40milliGRAM(s) IV Push every 8 hours    MEDICATIONS  (PRN):  ALBUTerol    0.083% 2.5milliGRAM(s) Nebulizer every 4 hours PRN Shortness of Breath and/or Wheezing  fentaNYL    Injectable 12.5MICROGram(s) IV Push every 6 hours PRN Mild Pain (1 - 3)    PHYSICAL EXAM:    Vital Signs Last 24 Hrs  T(C): 37.4, Max: 37.4 (03-21 @ 08:00)  T(F): 99.3, Max: 99.3 (03-21 @ 08:00)  HR: 84 (84 - 169)  BP: 128/97 (116/58 - 155/68)  BP(mean): 105 (79 - 125)  RR: 42 (24 - 48)  SpO2: 76% (76% - 100%)    General: lethargic                     Karnofsky:  10%    HEENT: normal  dry mouth     Lungs: tachypnea/labored breathing      CV: tachycardia    GI: mildly distended, OG tube    :  henao    MSK: weakness     Skin:  no rash    LABS:                          12.4   12.5  )-----------( 317      ( 21 Mar 2017 06:53 )             41.8     21 Mar 2017 06:53    153    |  104    |  48.0   ----------------------------<  194    4.4     |  >43.0  |  0.58     Ca    8.6        21 Mar 2017 06:53  Phos  2.3       21 Mar 2017 06:53  Mg     3.0       21 Mar 2017 06:53    TPro  6.7    /  Alb  3.2    /  TBili  0.3    /  DBili  x      /  AST  153    /  ALT  265    /  AlkPhos  76     21 Mar 2017 06:53    PT/INR - ( 21 Mar 2017 06:53 )   PT: 12.9 sec;   INR: 1.17 ratio      PTT - ( 21 Mar 2017 06:53 )  PTT:29.6 sec    I&O's Summary  I & Os for 24h ending 21 Mar 2017 07:00  =============================================  IN: 2914.1 ml / OUT: 1 ml / NET: 2913.1 ml    I & Os for current day (as of 21 Mar 2017 11:41)  =============================================  IN: 180 ml / OUT: 1 ml / NET: 179 ml    RADIOLOGY & ADDITIONAL STUDIES: reviewed    ADVANCE DIRECTIVES: DNR, DNI once extubated, no tracheostomy

## 2017-03-21 NOTE — PROGRESS NOTE ADULT - ASSESSMENT
71 YOM with acute respiratory failure due to hypoxia and hypercarbia, COPD exacerbation, Afib with RVR, Altered mental state/encephalopathy-change in mental status

## 2017-03-21 NOTE — PROGRESS NOTE ADULT - SUBJECTIVE AND OBJECTIVE BOX
Pt is a 71 YOM h/o COPD, HTN, Anxiety, Afib had recently been admitted with human MPV and acute respiratory failure, failed bipap and was intubated.  Had a short 2-3 min. cardiac arrest with ROSC.  Pt has been receiving treatment with ventilatory support, nebulizer treatments, steroids but has not made improvements in his respiratory status, he has not tolerated any weaning trials over last several days.  Palliative care has been following and family would like to give pt a few more days to try to see if he improves but ultimately they all agree that he would not want to be intubated/trached long term.  Yesterday he was very tachycardic and was started on Esmolol gtt. Today pt is much less responsive than he has been which is a change, will get head CT today.  Patient is a 71y old  Male who presents with a chief complaint of     BRIEF HOSPITAL COURSE: as above    Events last 24 hours: as above    PAST MEDICAL & SURGICAL HISTORY:  Gastric ulcer  BPH (Benign Prostatic Hyperplasia)  Gastric reflux  COPD (chronic obstructive pulmonary disease)  Anxiety disorder  HTN (hypertension)  S/P colonoscopy      Review of Systems:  Pt intubated and unresponsive      Medications:    tamsulosin 0.4milliGRAM(s) Oral at bedtime  midodrine 10milliGRAM(s) Oral three times a day  esMOLOL  Infusion 50MICROgram(s)/kG/Min IV Continuous <Continuous>    ALBUTerol/ipratropium for Nebulization 3milliLiter(s) Nebulizer every 4 hours  ALBUTerol    0.083% 2.5milliGRAM(s) Nebulizer every 4 hours PRN  ALBUTerol    0.083%. 15milliGRAM(s) Nebulizer once    fentaNYL    Injectable 12.5MICROGram(s) IV Push every 6 hours PRN  QUEtiapine 25milliGRAM(s) Oral two times a day      enoxaparin Injectable 40milliGRAM(s) SubCutaneous every 24 hours    pantoprazole   Suspension 40milliGRAM(s) Oral daily  polyethylene glycol 3350 17Gram(s) Oral daily      methylPREDNISolone sodium succinate Injectable 40milliGRAM(s) IV Push every 8 hours    folic acid 1milliGRAM(s) Oral daily  thiamine 100milliGRAM(s) Oral daily      chlorhexidine 0.12% Liquid 15milliLiter(s) Swish and Spit two times a day        Mode: AC/ CMV (Assist Control/ Continuous Mandatory Ventilation)  RR (machine): 24  TV (machine): 500  FiO2: 40  PEEP: 5  MAP: 11  PIP: 36      ICU Vital Signs Last 24 Hrs  T(C): 37.4, Max: 37.4 (03-21 @ 08:00)  T(F): 99.3, Max: 99.3 (03-21 @ 08:00)  HR: 114 (84 - 147)  BP: 165/74 (78/50 - 165/74)  BP(mean): 105 (58 - 105)  ABP: --  ABP(mean): --  RR: 45 (18 - 48)  SpO2: 95% (76% - 100%)                LABS:                        12.4   12.5  )-----------( 317      ( 21 Mar 2017 06:53 )             41.8     21 Mar 2017 06:53    153    |  104    |  48.0   ----------------------------<  194    4.4     |  >43.0  |  0.58     Ca    8.6        21 Mar 2017 06:53  Phos  2.3       21 Mar 2017 06:53  Mg     3.0       21 Mar 2017 06:53    TPro  6.7    /  Alb  3.2    /  TBili  0.3    /  DBili  x      /  AST  153    /  ALT  265    /  AlkPhos  76     21 Mar 2017 06:53          CAPILLARY BLOOD GLUCOSE  148 (21 Mar 2017 12:00)    PT/INR - ( 21 Mar 2017 06:53 )   PT: 12.9 sec;   INR: 1.17 ratio         PTT - ( 21 Mar 2017 06:53 )  PTT:29.6 sec    CULTURES:  Culture Results:   No routine respiratory gay Isolated (03-17 @ 15:04)  Culture Results:   No growth (03-16 @ 17:19)  Rapid RVP Result: Detected (03-15 @ 21:37)  Culture Results:   No growth at 5 days. (03-15 @ 14:03)  Culture Results:   No growth at 5 days. (03-15 @ 14:03)      Physical Examination:    General: Intubated, no sedation, not responsive at present, has been agitated intermittently    HEENT: Pupils equal sluggish, reactive to light.  Symmetric.    PULM: Course with poor air exchange bilaterally, occ wheeze at bases, no significant sputum production    CVS: Tachy/irreg, no murmurs, rubs, or gallops    ABD: Soft, nondistended, nontender, normoactive bowel sounds, no masses    EXT: Mild edema all ext,  nontender    SKIN: Warm and well perfused, no rashes noted.    RADIOLOGY: ***    CRITICAL CARE TIME SPENT: 40 min

## 2017-03-22 LAB
APPEARANCE UR: CLEAR — SIGNIFICANT CHANGE UP
BACTERIA # UR AUTO: ABNORMAL
BILIRUB UR-MCNC: NEGATIVE — SIGNIFICANT CHANGE UP
COLOR SPEC: YELLOW — SIGNIFICANT CHANGE UP
DIFF PNL FLD: NEGATIVE — SIGNIFICANT CHANGE UP
EPI CELLS # UR: NEGATIVE — SIGNIFICANT CHANGE UP
GLUCOSE UR QL: 100 MG/DL
GRAM STN FLD: SIGNIFICANT CHANGE UP
KETONES UR-MCNC: NEGATIVE — SIGNIFICANT CHANGE UP
LEUKOCYTE ESTERASE UR-ACNC: NEGATIVE — SIGNIFICANT CHANGE UP
NITRITE UR-MCNC: NEGATIVE — SIGNIFICANT CHANGE UP
PH UR: 8 — SIGNIFICANT CHANGE UP (ref 4.8–8)
PROT UR-MCNC: 15 MG/DL
RBC CASTS # UR COMP ASSIST: SIGNIFICANT CHANGE UP /HPF (ref 0–4)
SP GR SPEC: 1.01 — SIGNIFICANT CHANGE UP (ref 1.01–1.02)
SPECIMEN SOURCE: SIGNIFICANT CHANGE UP
TRI-PHOS CRY UR QL COMP ASSIST: ABNORMAL
UROBILINOGEN FLD QL: NEGATIVE MG/DL — SIGNIFICANT CHANGE UP
WBC UR QL: SIGNIFICANT CHANGE UP

## 2017-03-22 PROCEDURE — 99233 SBSQ HOSP IP/OBS HIGH 50: CPT

## 2017-03-22 PROCEDURE — 99291 CRITICAL CARE FIRST HOUR: CPT

## 2017-03-22 PROCEDURE — 71250 CT THORAX DX C-: CPT | Mod: 26

## 2017-03-22 PROCEDURE — 70450 CT HEAD/BRAIN W/O DYE: CPT | Mod: 26

## 2017-03-22 RX ORDER — DEXTROSE 50 % IN WATER 50 %
25 SYRINGE (ML) INTRAVENOUS ONCE
Qty: 0 | Refills: 0 | Status: DISCONTINUED | OUTPATIENT
Start: 2017-03-22 | End: 2017-03-23

## 2017-03-22 RX ORDER — METOPROLOL TARTRATE 50 MG
50 TABLET ORAL
Qty: 0 | Refills: 0 | Status: DISCONTINUED | OUTPATIENT
Start: 2017-03-22 | End: 2017-03-23

## 2017-03-22 RX ORDER — SODIUM CHLORIDE 9 MG/ML
1000 INJECTION, SOLUTION INTRAVENOUS
Qty: 0 | Refills: 0 | Status: DISCONTINUED | OUTPATIENT
Start: 2017-03-22 | End: 2017-03-23

## 2017-03-22 RX ORDER — DEXTROSE 50 % IN WATER 50 %
1 SYRINGE (ML) INTRAVENOUS ONCE
Qty: 0 | Refills: 0 | Status: DISCONTINUED | OUTPATIENT
Start: 2017-03-22 | End: 2017-03-23

## 2017-03-22 RX ORDER — INSULIN LISPRO 100/ML
VIAL (ML) SUBCUTANEOUS EVERY 6 HOURS
Qty: 0 | Refills: 0 | Status: DISCONTINUED | OUTPATIENT
Start: 2017-03-22 | End: 2017-03-23

## 2017-03-22 RX ORDER — DEXTROSE 50 % IN WATER 50 %
12.5 SYRINGE (ML) INTRAVENOUS ONCE
Qty: 0 | Refills: 0 | Status: DISCONTINUED | OUTPATIENT
Start: 2017-03-22 | End: 2017-03-23

## 2017-03-22 RX ORDER — MIDODRINE HYDROCHLORIDE 2.5 MG/1
5 TABLET ORAL THREE TIMES A DAY
Qty: 0 | Refills: 0 | Status: DISCONTINUED | OUTPATIENT
Start: 2017-03-22 | End: 2017-03-22

## 2017-03-22 RX ORDER — ACETAMINOPHEN 500 MG
650 TABLET ORAL ONCE
Qty: 0 | Refills: 0 | Status: COMPLETED | OUTPATIENT
Start: 2017-03-22 | End: 2017-03-22

## 2017-03-22 RX ORDER — GLUCAGON INJECTION, SOLUTION 0.5 MG/.1ML
1 INJECTION, SOLUTION SUBCUTANEOUS ONCE
Qty: 0 | Refills: 0 | Status: DISCONTINUED | OUTPATIENT
Start: 2017-03-22 | End: 2017-03-23

## 2017-03-22 RX ADMIN — Medication 3 MILLILITER(S): at 23:51

## 2017-03-22 RX ADMIN — Medication 100 MILLIGRAM(S): at 12:49

## 2017-03-22 RX ADMIN — FENTANYL CITRATE 12.5 MICROGRAM(S): 50 INJECTION INTRAVENOUS at 14:07

## 2017-03-22 RX ADMIN — Medication 3 MILLILITER(S): at 00:17

## 2017-03-22 RX ADMIN — PANTOPRAZOLE SODIUM 40 MILLIGRAM(S): 20 TABLET, DELAYED RELEASE ORAL at 12:49

## 2017-03-22 RX ADMIN — MIDODRINE HYDROCHLORIDE 5 MILLIGRAM(S): 2.5 TABLET ORAL at 13:31

## 2017-03-22 RX ADMIN — MIDODRINE HYDROCHLORIDE 10 MILLIGRAM(S): 2.5 TABLET ORAL at 06:10

## 2017-03-22 RX ADMIN — QUETIAPINE FUMARATE 25 MILLIGRAM(S): 200 TABLET, FILM COATED ORAL at 06:10

## 2017-03-22 RX ADMIN — CHLORHEXIDINE GLUCONATE 15 MILLILITER(S): 213 SOLUTION TOPICAL at 06:10

## 2017-03-22 RX ADMIN — Medication 650 MILLIGRAM(S): at 01:22

## 2017-03-22 RX ADMIN — Medication 50 MILLIGRAM(S): at 13:24

## 2017-03-22 RX ADMIN — Medication 40 MILLIGRAM(S): at 22:04

## 2017-03-22 RX ADMIN — Medication 3 MILLILITER(S): at 20:43

## 2017-03-22 RX ADMIN — Medication 3 MILLILITER(S): at 03:43

## 2017-03-22 RX ADMIN — Medication 40 MILLIGRAM(S): at 06:10

## 2017-03-22 RX ADMIN — CHLORHEXIDINE GLUCONATE 15 MILLILITER(S): 213 SOLUTION TOPICAL at 17:34

## 2017-03-22 RX ADMIN — ENOXAPARIN SODIUM 40 MILLIGRAM(S): 100 INJECTION SUBCUTANEOUS at 22:04

## 2017-03-22 RX ADMIN — POLYETHYLENE GLYCOL 3350 17 GRAM(S): 17 POWDER, FOR SOLUTION ORAL at 12:49

## 2017-03-22 RX ADMIN — Medication 1 MILLIGRAM(S): at 12:50

## 2017-03-22 RX ADMIN — Medication 3 MILLILITER(S): at 13:05

## 2017-03-22 RX ADMIN — FENTANYL CITRATE 12.5 MICROGRAM(S): 50 INJECTION INTRAVENOUS at 14:30

## 2017-03-22 RX ADMIN — HYDROMORPHONE HYDROCHLORIDE 0.5 MILLIGRAM(S): 2 INJECTION INTRAMUSCULAR; INTRAVENOUS; SUBCUTANEOUS at 01:18

## 2017-03-22 RX ADMIN — TAMSULOSIN HYDROCHLORIDE 0.4 MILLIGRAM(S): 0.4 CAPSULE ORAL at 22:04

## 2017-03-22 RX ADMIN — Medication 40 MILLIGRAM(S): at 13:25

## 2017-03-22 RX ADMIN — Medication 3 MILLILITER(S): at 08:12

## 2017-03-22 RX ADMIN — Medication 3 MILLILITER(S): at 16:55

## 2017-03-22 NOTE — PROGRESS NOTE ADULT - SUBJECTIVE AND OBJECTIVE BOX
Pt is a 71 YOM h/o COPD, HTN, Anxiety, Afib had recently been admitted with human MPV and acute respiratory failure, failed bipap and was intubated.  Had a short 2-3 min. cardiac arrest with ROSC.  Pt has been receiving treatment with ventilatory support, nebulizer treatments, steroids but has not made improvements in his respiratory status.  Palliative care has been following and family would like to give pt a few more days to try to see if he improves but ultimately they all agree that he would not want to be intubated/trached long term. He was very tachycardic and was started on Esmolol gtt. pt was much less responsive than he has been in previous days so CT scan head was performed and was negative so his seroquel was stopped.  Pt did tolerate approx 30 min of weaning trials today on higher pressure support, so we will continue to try to wean him.  Patient is a 71y old  Male who presents with a chief complaint of     BRIEF HOSPITAL COURSE: as above    Events last 24 hours: as above    PAST MEDICAL & SURGICAL HISTORY:  Gastric ulcer  BPH (Benign Prostatic Hyperplasia)  Gastric reflux  COPD (chronic obstructive pulmonary disease)  Anxiety disorder  HTN (hypertension)  S/P colonoscopy      Review of Systems:  Pt intubated and unresponsive      Medications:    tamsulosin 0.4milliGRAM(s) Oral at bedtime  midodrine 10milliGRAM(s) Oral three times a day  esMOLOL  Infusion 50MICROgram(s)/kG/Min IV Continuous <Continuous>    ALBUTerol/ipratropium for Nebulization 3milliLiter(s) Nebulizer every 4 hours  ALBUTerol    0.083% 2.5milliGRAM(s) Nebulizer every 4 hours PRN  ALBUTerol    0.083%. 15milliGRAM(s) Nebulizer once    fentaNYL    Injectable 12.5MICROGram(s) IV Push every 6 hours PRN  QUEtiapine 25milliGRAM(s) Oral two times a day      enoxaparin Injectable 40milliGRAM(s) SubCutaneous every 24 hours    pantoprazole   Suspension 40milliGRAM(s) Oral daily  polyethylene glycol 3350 17Gram(s) Oral daily      methylPREDNISolone sodium succinate Injectable 40milliGRAM(s) IV Push every 8 hours    folic acid 1milliGRAM(s) Oral daily  thiamine 100milliGRAM(s) Oral daily      chlorhexidine 0.12% Liquid 15milliLiter(s) Swish and Spit two times a day        Mode: AC/ CMV (Assist Control/ Continuous Mandatory Ventilation)  RR (machine): 24  TV (machine): 500  FiO2: 40  PEEP: 5  MAP: 11  PIP: 36                          12.1   12.2  )-----------( 337      ( 22 Mar 2017 07:05 )             40.4   22 Mar 2017 07:05    150    |  103    |  50.0   ----------------------------<  245    4.5     |  41.0   |  0.64     Ca    8.8        22 Mar 2017 07:05  Phos  2.4       22 Mar 2017 07:05  Mg     2.7       22 Mar 2017 07:05    TPro  6.7    /  Alb  3.2    /  TBili  0.3    /  DBili  x      /  AST  153    /  ALT  265    /  AlkPhos  76     21 Mar 2017 06:53      Physical Examination:    General: Intubated, no sedation, not responsive at present, has been agitated intermittently    HEENT: Pupils equal sluggish, reactive to light.  Symmetric.    PULM: Course with poor air exchange bilaterally, occ wheeze at bases, no significant sputum production    CVS: Tachy/irreg, no murmurs, rubs, or gallops    ABD: Soft, nondistended, nontender, normoactive bowel sounds, no masses    EXT: Mod edema all ext,  nontender    SKIN: Warm and well perfused, no rashes noted.    RADIOLOGY: ***    CRITICAL CARE TIME SPENT: 40 min

## 2017-03-22 NOTE — PROGRESS NOTE ADULT - ATTENDING COMMENTS
Agree with above:    Talked with Darnell Eng HCP will see if any further improvement in next day or two if none or worsen will pursue comfort measures no trach, no peg, no CPR

## 2017-03-22 NOTE — PROGRESS NOTE ADULT - ATTENDING COMMENTS
Thank you for the opportunity to assist with the care of this patient.   [facility] [phone].

## 2017-03-22 NOTE — PROGRESS NOTE ADULT - SUBJECTIVE AND OBJECTIVE BOX
OVERNIGHT EVENTS: still vent dependent, but tolerated SBT for longer period today    Present Symptoms:     Dyspnea:  2   Nausea/Vomiting: No  Anxiety:  No  Depression: No  Fatigue: No  Loss of appetite: No    Pain: none    Review of Systems: Reviewed           Unable to obtain due to poor mentation     MEDICATIONS  (STANDING):  tamsulosin 0.4milliGRAM(s) Oral at bedtime  enoxaparin Injectable 40milliGRAM(s) SubCutaneous every 24 hours  ALBUTerol/ipratropium for Nebulization 3milliLiter(s) Nebulizer every 4 hours  ALBUTerol    0.083%. 15milliGRAM(s) Nebulizer once  chlorhexidine 0.12% Liquid 15milliLiter(s) Swish and Spit two times a day  folic acid 1milliGRAM(s) Oral daily  thiamine 100milliGRAM(s) Oral daily  pantoprazole   Suspension 40milliGRAM(s) Oral daily  polyethylene glycol 3350 17Gram(s) Oral daily  esMOLOL  Infusion 50MICROgram(s)/kG/Min IV Continuous <Continuous>  methylPREDNISolone sodium succinate Injectable 40milliGRAM(s) IV Push every 8 hours  insulin lispro (HumaLOG) corrective regimen sliding scale  SubCutaneous every 6 hours  dextrose 5%. 1000milliLiter(s) IV Continuous <Continuous>  dextrose 50% Injectable 12.5Gram(s) IV Push once  dextrose 50% Injectable 25Gram(s) IV Push once  dextrose 50% Injectable 25Gram(s) IV Push once  metoprolol 50milliGRAM(s) Oral two times a day  midodrine 5milliGRAM(s) Oral three times a day    MEDICATIONS  (PRN):  ALBUTerol    0.083% 2.5milliGRAM(s) Nebulizer every 4 hours PRN Shortness of Breath and/or Wheezing  fentaNYL    Injectable 12.5MICROGram(s) IV Push every 6 hours PRN Mild Pain (1 - 3)  dextrose Gel 1Dose(s) Oral once PRN Blood Glucose LESS THAN 70 milliGRAM(s)/deciLiter  glucagon  Injectable 1milliGRAM(s) IntraMuscular once PRN Glucose <70 milliGRAM(s)/deciLiter    PHYSICAL EXAM:    Vital Signs Last 24 Hrs  T(C): 37.7, Max: 38.6 (03-21 @ 23:43)  T(F): 99.8, Max: 101.4 (03-21 @ 23:43)  HR: 83 (83 - 122)  BP: 94/57 (94/57 - 165/72)  BP(mean): 70 (70 - 118)  RR: 24 (22 - 43)  SpO2: 96% (93% - 97%)    General: lethargic                     Karnofsky:  10%    HEENT: normal  dry mouth     Lungs: tachypnea/labored breathing, ET tube    CV: tachycardia    GI: mildly distended, OG tube    :  henao    MSK: weakness     Skin:  no rash    LABS: Reviewed    RADIOLOGY & ADDITIONAL STUDIES: reviewed    ADVANCE DIRECTIVES: DNR, DNI once extubated, no tracheostomy

## 2017-03-22 NOTE — PROGRESS NOTE ADULT - PROBLEM SELECTOR PROBLEM 5
Acute respiratory failure with hypoxia and hypercapnia
Altered mental status, unspecified altered mental status type
Acute respiratory failure with hypoxia and hypercapnia
Afib
Altered mental status, unspecified altered mental status type
Benign prostatic hyperplasia, presence of lower urinary tract symptoms unspecified, unspecified morphology
Cardiac arrest

## 2017-03-22 NOTE — PROGRESS NOTE ADULT - PROBLEM SELECTOR PLAN 5
Head CT neg as above  Seroquel d/c'd due to lethargy  Monitor neurologically
patient on ventilator for hypoxia and respiratory failure. Ventilator bundle and lung protective ventilator settings in place.  - worsening hypercapnia despite increased rate; limited increase on Vt as Compliance is poor. giving acetazolamide for elevated HCO3 (was given furosemide last night with worsening contraction alkalosis)   - almost compensated respiratory acidosis.
Head CT today  Monitor neurologically
continue tamsulosin/finasteride
esmolol, digoxin, metroprolol
patient on ventilator for hypoxia and respiratory failure. Ventilator bundle and lung protective ventilator settings in place.    - worsening hypercapnia despite increased rate; limited increase on Vt as Compliance is poor. giving acetazolamide for elevated HCO3  -
As above; ROSC within 2-3 minutes with immediate CPR; no anoxic encephalopathy syndrome noted given his interaction this AM -- alert and interactive.

## 2017-03-23 VITALS — HEART RATE: 98 BPM

## 2017-03-23 PROCEDURE — 80053 COMPREHEN METABOLIC PANEL: CPT

## 2017-03-23 PROCEDURE — 80048 BASIC METABOLIC PNL TOTAL CA: CPT

## 2017-03-23 PROCEDURE — 84484 ASSAY OF TROPONIN QUANT: CPT

## 2017-03-23 PROCEDURE — 81001 URINALYSIS AUTO W/SCOPE: CPT

## 2017-03-23 PROCEDURE — 96374 THER/PROPH/DIAG INJ IV PUSH: CPT

## 2017-03-23 PROCEDURE — 87633 RESP VIRUS 12-25 TARGETS: CPT

## 2017-03-23 PROCEDURE — 84100 ASSAY OF PHOSPHORUS: CPT

## 2017-03-23 PROCEDURE — 83880 ASSAY OF NATRIURETIC PEPTIDE: CPT

## 2017-03-23 PROCEDURE — 85379 FIBRIN DEGRADATION QUANT: CPT

## 2017-03-23 PROCEDURE — 87798 DETECT AGENT NOS DNA AMP: CPT

## 2017-03-23 PROCEDURE — 94640 AIRWAY INHALATION TREATMENT: CPT

## 2017-03-23 PROCEDURE — 99231 SBSQ HOSP IP/OBS SF/LOW 25: CPT

## 2017-03-23 PROCEDURE — 36415 COLL VENOUS BLD VENIPUNCTURE: CPT

## 2017-03-23 PROCEDURE — 84132 ASSAY OF SERUM POTASSIUM: CPT

## 2017-03-23 PROCEDURE — 94660 CPAP INITIATION&MGMT: CPT

## 2017-03-23 PROCEDURE — 84295 ASSAY OF SERUM SODIUM: CPT

## 2017-03-23 PROCEDURE — 99221 1ST HOSP IP/OBS SF/LOW 40: CPT

## 2017-03-23 PROCEDURE — 85610 PROTHROMBIN TIME: CPT

## 2017-03-23 PROCEDURE — 93306 TTE W/DOPPLER COMPLETE: CPT

## 2017-03-23 PROCEDURE — 71250 CT THORAX DX C-: CPT

## 2017-03-23 PROCEDURE — 82435 ASSAY OF BLOOD CHLORIDE: CPT

## 2017-03-23 PROCEDURE — 85014 HEMATOCRIT: CPT

## 2017-03-23 PROCEDURE — 94002 VENT MGMT INPAT INIT DAY: CPT

## 2017-03-23 PROCEDURE — 87070 CULTURE OTHR SPECIMN AEROBIC: CPT

## 2017-03-23 PROCEDURE — 83735 ASSAY OF MAGNESIUM: CPT

## 2017-03-23 PROCEDURE — 87581 M.PNEUMON DNA AMP PROBE: CPT

## 2017-03-23 PROCEDURE — 31500 INSERT EMERGENCY AIRWAY: CPT

## 2017-03-23 PROCEDURE — 97163 PT EVAL HIGH COMPLEX 45 MIN: CPT

## 2017-03-23 PROCEDURE — 87040 BLOOD CULTURE FOR BACTERIA: CPT

## 2017-03-23 PROCEDURE — 94760 N-INVAS EAR/PLS OXIMETRY 1: CPT

## 2017-03-23 PROCEDURE — 36600 WITHDRAWAL OF ARTERIAL BLOOD: CPT

## 2017-03-23 PROCEDURE — 82803 BLOOD GASES ANY COMBINATION: CPT

## 2017-03-23 PROCEDURE — 83605 ASSAY OF LACTIC ACID: CPT

## 2017-03-23 PROCEDURE — 94003 VENT MGMT INPAT SUBQ DAY: CPT

## 2017-03-23 PROCEDURE — 96375 TX/PRO/DX INJ NEW DRUG ADDON: CPT

## 2017-03-23 PROCEDURE — 87486 CHLMYD PNEUM DNA AMP PROBE: CPT

## 2017-03-23 PROCEDURE — 85730 THROMBOPLASTIN TIME PARTIAL: CPT

## 2017-03-23 PROCEDURE — 99233 SBSQ HOSP IP/OBS HIGH 50: CPT

## 2017-03-23 PROCEDURE — 99497 ADVNCD CARE PLAN 30 MIN: CPT | Mod: 25

## 2017-03-23 PROCEDURE — 82947 ASSAY GLUCOSE BLOOD QUANT: CPT

## 2017-03-23 PROCEDURE — 87086 URINE CULTURE/COLONY COUNT: CPT

## 2017-03-23 PROCEDURE — 82330 ASSAY OF CALCIUM: CPT

## 2017-03-23 PROCEDURE — 71045 X-RAY EXAM CHEST 1 VIEW: CPT

## 2017-03-23 PROCEDURE — 70450 CT HEAD/BRAIN W/O DYE: CPT

## 2017-03-23 PROCEDURE — 99291 CRITICAL CARE FIRST HOUR: CPT | Mod: 25

## 2017-03-23 PROCEDURE — 93005 ELECTROCARDIOGRAM TRACING: CPT

## 2017-03-23 PROCEDURE — 85027 COMPLETE CBC AUTOMATED: CPT

## 2017-03-23 RX ORDER — MORPHINE SULFATE 50 MG/1
4 CAPSULE, EXTENDED RELEASE ORAL ONCE
Qty: 0 | Refills: 0 | Status: DISCONTINUED | OUTPATIENT
Start: 2017-03-23 | End: 2017-03-23

## 2017-03-23 RX ORDER — HYDROMORPHONE HYDROCHLORIDE 2 MG/ML
1 INJECTION INTRAMUSCULAR; INTRAVENOUS; SUBCUTANEOUS ONCE
Qty: 0 | Refills: 0 | Status: DISCONTINUED | OUTPATIENT
Start: 2017-03-23 | End: 2017-03-23

## 2017-03-23 RX ORDER — MORPHINE SULFATE 50 MG/1
2 CAPSULE, EXTENDED RELEASE ORAL
Qty: 100 | Refills: 0 | Status: DISCONTINUED | OUTPATIENT
Start: 2017-03-23 | End: 2017-03-23

## 2017-03-23 RX ORDER — ROBINUL 0.2 MG/ML
0.4 INJECTION INTRAMUSCULAR; INTRAVENOUS ONCE
Qty: 0 | Refills: 0 | Status: COMPLETED | OUTPATIENT
Start: 2017-03-23 | End: 2017-03-23

## 2017-03-23 RX ORDER — ROBINUL 0.2 MG/ML
0.4 INJECTION INTRAMUSCULAR; INTRAVENOUS EVERY 6 HOURS
Qty: 0 | Refills: 0 | Status: DISCONTINUED | OUTPATIENT
Start: 2017-03-23 | End: 2017-03-23

## 2017-03-23 RX ADMIN — HYDROMORPHONE HYDROCHLORIDE 1 MILLIGRAM(S): 2 INJECTION INTRAMUSCULAR; INTRAVENOUS; SUBCUTANEOUS at 13:47

## 2017-03-23 RX ADMIN — MORPHINE SULFATE 4 MILLIGRAM(S): 50 CAPSULE, EXTENDED RELEASE ORAL at 13:04

## 2017-03-23 RX ADMIN — CHLORHEXIDINE GLUCONATE 15 MILLILITER(S): 213 SOLUTION TOPICAL at 05:18

## 2017-03-23 RX ADMIN — MORPHINE SULFATE 2 MG/HR: 50 CAPSULE, EXTENDED RELEASE ORAL at 13:05

## 2017-03-23 RX ADMIN — HYDROMORPHONE HYDROCHLORIDE 1 MILLIGRAM(S): 2 INJECTION INTRAMUSCULAR; INTRAVENOUS; SUBCUTANEOUS at 13:32

## 2017-03-23 RX ADMIN — Medication 3 MILLILITER(S): at 04:19

## 2017-03-23 RX ADMIN — Medication 2 MILLIGRAM(S): at 12:49

## 2017-03-23 RX ADMIN — MORPHINE SULFATE 4 MILLIGRAM(S): 50 CAPSULE, EXTENDED RELEASE ORAL at 12:49

## 2017-03-23 RX ADMIN — Medication 3 MILLILITER(S): at 07:36

## 2017-03-23 RX ADMIN — ROBINUL 0.4 MILLIGRAM(S): 0.2 INJECTION INTRAMUSCULAR; INTRAVENOUS at 12:50

## 2017-03-23 RX ADMIN — Medication 50 MILLIGRAM(S): at 00:13

## 2017-03-23 RX ADMIN — Medication 40 MILLIGRAM(S): at 05:18

## 2017-03-23 RX ADMIN — Medication 4 MILLIGRAM(S): at 13:32

## 2017-03-23 RX ADMIN — MORPHINE SULFATE 2 MG/HR: 50 CAPSULE, EXTENDED RELEASE ORAL at 12:50

## 2017-03-23 NOTE — PROGRESS NOTE ADULT - SUBJECTIVE AND OBJECTIVE BOX
Patient is a 71y old  Male who presents with a chief complaint of     BRIEF HOSPITAL COURSE: 71M w/ COPD exacerbationa nd (+) hMPV PNA, required intubation. HAs not tolerated weaning trials    Events last 24 hours: Remains intubated on full vent support. DNR now    PAST MEDICAL & SURGICAL HISTORY:  Gastric ulcer  BPH (Benign Prostatic Hyperplasia)  Gastric reflux  COPD (chronic obstructive pulmonary disease)  Anxiety disorder  HTN (hypertension)  S/P colonoscopy      Review of Systems:  N/A, intubated and sedated      Medications:    tamsulosin 0.4milliGRAM(s) Oral at bedtime  metoprolol 50milliGRAM(s) Oral two times a day    ALBUTerol/ipratropium for Nebulization 3milliLiter(s) Nebulizer every 4 hours  ALBUTerol    0.083% 2.5milliGRAM(s) Nebulizer every 4 hours PRN  ALBUTerol    0.083%. 15milliGRAM(s) Nebulizer once    fentaNYL    Injectable 12.5MICROGram(s) IV Push every 6 hours PRN      enoxaparin Injectable 40milliGRAM(s) SubCutaneous every 24 hours    pantoprazole   Suspension 40milliGRAM(s) Oral daily  polyethylene glycol 3350 17Gram(s) Oral daily      methylPREDNISolone sodium succinate Injectable 40milliGRAM(s) IV Push every 8 hours  insulin lispro (HumaLOG) corrective regimen sliding scale  SubCutaneous every 6 hours  dextrose Gel 1Dose(s) Oral once PRN  dextrose 50% Injectable 12.5Gram(s) IV Push once  dextrose 50% Injectable 25Gram(s) IV Push once  dextrose 50% Injectable 25Gram(s) IV Push once  glucagon  Injectable 1milliGRAM(s) IntraMuscular once PRN    folic acid 1milliGRAM(s) Oral daily  thiamine 100milliGRAM(s) Oral daily  dextrose 5%. 1000milliLiter(s) IV Continuous <Continuous>      chlorhexidine 0.12% Liquid 15milliLiter(s) Swish and Spit two times a day        Mode: AC/ CMV (Assist Control/ Continuous Mandatory Ventilation)  RR (machine): 24  TV (machine): 500  FiO2: 40  PEEP: 5  MAP: 10  PIP: 33      ICU Vital Signs Last 24 Hrs  T(C): 38, Max: 38.5 ( @ 07:00)  T(F): 100.4, Max: 101.3 ( @ 07:00)  HR: 118 (83 - 123)  BP: 166/81 (94/57 - 168/77)  BP(mean): 113 (70 - 113)  ABP: --  ABP(mean): --  RR: 35 (22 - 46)  SpO2: 92% (78% - 98%)          I&O's Detail  I & Os for 24h ending 22 Mar 2017 07:00  =============================================  IN:    Jevity: 1440 ml    Free Water: 1250 ml    esmolol Infusion: 374 ml    Solution: 150 ml    Total IN: 3214 ml  ---------------------------------------------  OUT:    Incontinent per Condom Catheter: 2151 ml    Total OUT: 2151 ml  ---------------------------------------------  Total NET: 1063 ml    I & Os for current day (as of 23 Mar 2017 00:23)  =============================================  IN:    Free Water: 1000 ml    Glucerna: 480 ml    Jevity: 420 ml    esmolol Infusion: 120 ml    Total IN: 2020 ml  ---------------------------------------------  OUT:    Incontinent per Condom Catheter: 540 ml    Total OUT: 540 ml  ---------------------------------------------  Total NET: 1480 ml        LABS:                        12.1   12.2  )-----------( 337      ( 22 Mar 2017 07:05 )             40.4     22 Mar 2017 07:05    150    |  103    |  50.0   ----------------------------<  245    4.5     |  41.0   |  0.64     Ca    8.8        22 Mar 2017 07:05  Phos  2.4       22 Mar 2017 07:05  Mg     2.7       22 Mar 2017 07:05    TPro  6.7    /  Alb  3.2    /  TBili  0.3    /  DBili  x      /  AST  153    /  ALT  265    /  AlkPhos  76     21 Mar 2017 06:53          CAPILLARY BLOOD GLUCOSE  164 (22 Mar 2017 18:00)    PT/INR - ( 21 Mar 2017 06:53 )   PT: 12.9 sec;   INR: 1.17 ratio         PTT - ( 21 Mar 2017 06:53 )  PTT:29.6 sec  Urinalysis Basic - ( 22 Mar 2017 04:25 )    Color: Yellow / Appearance: Clear / S.010 / pH: x  Gluc: x / Ketone: Negative  / Bili: Negative / Urobili: Negative mg/dL   Blood: x / Protein: 15 mg/dL / Nitrite: Negative   Leuk Esterase: Negative / RBC: 0-2 /HPF / WBC 0-2   Sq Epi: x / Non Sq Epi: Negative / Bacteria: Occasional      CULTURES:  Culture Results:   No routine respiratory gay Isolated ( @ 15:04)  Culture Results:   No growth ( @ 17:19)      Physical Examination:    General: intubated, sedated     HEENT: Pupils equal, reactive to light.  Symmetric.    PULM: diminished at bases b/l, b/l exspiratory wheeze    CVS: Regular rate and rhythm, no murmurs, rubs, or gallops    ABD: Soft, nondistended, nontender, normoactive bowel sounds, no masses    EXT: No edema, nontender    SKIN: Warm and well perfused, no rashes noted.        CRITICAL CARE TIME SPENT: 40 minutes

## 2017-03-23 NOTE — PROGRESS NOTE ADULT - PROBLEM SELECTOR PLAN 1
-still not doing well with SBTs
-tolerated SBT a little better this AM
Continue ventilatory support, continue to do weaning trials to see if any forward progress can be made to extubate, if he doesn't make forward progress family may move toward comfort care  Continue nebs txs.
Improving respiratory mechanics.  No AutoPEEP noted on ventilator this AM.  No wheezing appreciated.  Good air movement.  Continue bronchodilators, steroids, antibiotics.  Check sputum culture to ensure no additional bacterial component.  Failed breathing trial this AM, low tidal volumes, immediate jump in capnography, increased HR all within 60 seconds.
no events since; was post intubation and brief no focal neurologic deficits; awoke appropriately therefore no modified hypothermia
-secondary to COPD exacerbation and hMPV infection causing possible viral PNA  -BIPAP wean as tolerated, Steroids (Solumedrol  loading dose and then 40 q8), Duonebs q4)  -Patient also with abdominal distention, states no BM in 5 days. Was planning on lactulose, colace, and Miralax  for bowel movement/NGT with suction however patient decompensated before treatment.  -Patient is currently in ICU
-will continue nebs and steroids  -lovenox
Continue ventilatory support, failed this afternoon's weaning trial again  Continue nebs txs.
continue supportive care
no events since; was post intubation and brief no focal neurologic deficits; awoke appropriately therefore no modified hypothermia
steriods, nebs
-SBT as tolerated

## 2017-03-23 NOTE — PROGRESS NOTE ADULT - SUBJECTIVE AND OBJECTIVE BOX
OVERNIGHT EVENTS: persistent vent dependence    Present Symptoms:     Dyspnea:  2   Nausea/Vomiting: No  Anxiety:  No  Depression: No  Fatigue: No  Loss of appetite: No    Pain: none    Review of Systems: Reviewed           Unable to obtain due to poor mentation     MEDICATIONS  (STANDING):  tamsulosin 0.4milliGRAM(s) Oral at bedtime  enoxaparin Injectable 40milliGRAM(s) SubCutaneous every 24 hours  ALBUTerol/ipratropium for Nebulization 3milliLiter(s) Nebulizer every 4 hours  chlorhexidine 0.12% Liquid 15milliLiter(s) Swish and Spit two times a day  folic acid 1milliGRAM(s) Oral daily  thiamine 100milliGRAM(s) Oral daily  pantoprazole   Suspension 40milliGRAM(s) Oral daily  polyethylene glycol 3350 17Gram(s) Oral daily  methylPREDNISolone sodium succinate Injectable 40milliGRAM(s) IV Push every 8 hours  insulin lispro (HumaLOG) corrective regimen sliding scale  SubCutaneous every 6 hours  dextrose 50% Injectable 12.5Gram(s) IV Push once  dextrose 50% Injectable 25Gram(s) IV Push once  dextrose 50% Injectable 25Gram(s) IV Push once  metoprolol 50milliGRAM(s) Oral two times a day    MEDICATIONS  (PRN):  ALBUTerol    0.083% 2.5milliGRAM(s) Nebulizer every 4 hours PRN Shortness of Breath and/or Wheezing  fentaNYL    Injectable 12.5MICROGram(s) IV Push every 6 hours PRN Mild Pain (1 - 3)  dextrose Gel 1Dose(s) Oral once PRN Blood Glucose LESS THAN 70 milliGRAM(s)/deciLiter  glucagon  Injectable 1milliGRAM(s) IntraMuscular once PRN Glucose <70 milliGRAM(s)/deciLiter    PHYSICAL EXAM:    Vital Signs Last 24 Hrs  T(C): 38.2, Max: 38.2 (03-23 @ 10:00)  T(F): 100.8, Max: 100.8 (03-23 @ 10:00)  HR: 123 (83 - 123)  BP: 158/75 (94/57 - 170/81)  BP(mean): 108 (70 - 117)  RR: 42 (23 - 48)  SpO2: 94% (78% - 98%)    General: lethargic                     Karnofsky:  10%    HEENT: normal  dry mouth     Lungs: tachypnea/labored breathing, ET tube    CV: tachycardia    GI: mildly distended, OG tube    :  henao    MSK: weakness     Skin:  no rash    LABS: Reviewed    RADIOLOGY & ADDITIONAL STUDIES: reviewed    ADVANCE DIRECTIVES: DNR, DNI once extubated, no tracheostomy

## 2017-03-23 NOTE — GOALS OF CARE CONVERSATION - PERSONAL ADVANCE DIRECTIVE - TREATMENT GUIDELINES
Comfort measures only/No antibiotics/No IV fluids/No blood draws/No artificial nutrition/DNR Order
DNR Order

## 2017-03-23 NOTE — DISCHARGE NOTE FOR THE EXPIRED PATIENT - HOSPITAL COURSE
71 years male with PMH of HTN,BPH, COPD on home oxygen 4 Liters presented to ED with difficulty breathing, cough and fever. Pt denies chest pain, back pain, N/V/D.  Pt denies any abdominal pain, urinary symptoms, back pain headaches or any other symptoms. [from H&P]    Mr. Hay was transferred to the MICU for worsening respiratory distress hypoxia/hypercapnia requiring intubation; he continued to suffer from acute on chronic hypercapnia with respiratory acidosis from COPD and poor lung compliance. He did have bouts of hypotension (requiring vasopressors) and tachycardia (requiring esmolol infusion), both of which were eventually titrated off. On 3/23 in accordance with previous discussion he had with family members, he was electively removed from the ventilator and  peacefully with his brother in law at bedside.

## 2017-03-23 NOTE — PROGRESS NOTE ADULT - PROVIDER SPECIALTY LIST ADULT
Critical Care
Hospitalist
Palliative Care
Pulmonology
Pulmonology
MICU

## 2017-03-23 NOTE — PROGRESS NOTE ADULT - PROBLEM SELECTOR PLAN 4
-a couple more days to see if he can medically wean off ventilator, if unable, removal of ventilator likely this weekend per HCP brother in law Velasquez.
-if no improvements by today and tomorrow AM, will discuss withdrawal of life support with HCP Brother Velasquez.
Did have recent steroid exposure; adjusted steroids to allow for 200mg hydrocortisone daily ("stress dose") to 50q6.  Continues to have a requirement for norepinephrine.
RVR   Restarted on PO metoprolol today HR much better  Weaned off Esmolol will keep off as long as HR less than 120
nebs, steroids  - was placed on stress dose steroids, will continue with these for next few days  - h/o steroid use previously for COPD
-Continue PPI
RVR   Now on Esmolol gtt will continue for rate control  Also on PO Bblockers
continue diuresis   track urine output strictly
nebs, steroids  - was placed on stress dose steroids, will continue with these for next few days  - h/o steroid use previously for COPD
supportive care
-Met Velasquez, brother in law, at bedside, he is waiting a couple more days, thinking by the end of the week, if no improvements, elective removal of ventilator and comfort measures only.

## 2017-03-23 NOTE — PROGRESS NOTE ADULT - PROBLEM SELECTOR PROBLEM 4
Palliative care encounter
COPD exacerbation
Palliative care encounter
Palliative care encounter
Persistent atrial fibrillation
Shock
COPD exacerbation
Gastric reflux
Human metapneumovirus (hMPV) pneumonia
Persistent atrial fibrillation
Pulmonary edema

## 2017-03-23 NOTE — GOALS OF CARE CONVERSATION - PERSONAL ADVANCE DIRECTIVE - CONVERSATION DETAILS
Met with HCP Velasquez, unfortunately it does not seem he will be able to wean off the ventilator medically, Velasquez has made the decision to electively withdraw the ventilator as this would not be in line with patient's wishes to be prolonged on machines.
Spoke with Brother in law regarding overall condition. he understands that we are trying all we can to attempt to medically get him off the ventilator. He states that a year and a half ago patient's wife (his sister) was hospitalized and after that time, he addressed his wishes. He knows he would not want long term ventilation.

## 2017-03-23 NOTE — PROGRESS NOTE ADULT - PROBLEM SELECTOR PROBLEM 2
Cardiac arrest
Cardiac arrest
Chronic obstructive pulmonary disease with acute exacerbation
Shock
Human metapneumovirus (hMPV) pneumonia
COPD exacerbation
COPD exacerbation
Cardiac arrest
Chronic obstructive pulmonary disease with acute exacerbation
Human metapneumovirus (hMPV) pneumonia
Respiratory failure requiring intubation
Shock

## 2017-03-23 NOTE — PROGRESS NOTE ADULT - PROBLEM SELECTOR PROBLEM 3
Chronic obstructive pulmonary disease with acute exacerbation
Encephalopathy acute
Human metapneumovirus (hMPV) pneumonia
COPD exacerbation
Acute respiratory failure with hypoxia and hypercapnia
Cardiac arrest
Encephalopathy acute
Human metapneumovirus (hMPV) pneumonia
Human metapneumovirus (hMPV) pneumonia
Respiratory failure requiring intubation

## 2017-03-23 NOTE — GOALS OF CARE CONVERSATION - PERSONAL ADVANCE DIRECTIVE - WHAT MATTERS MOST
That he be permitted to die peacefully and comfortably.
Also addressed that if despite all we are doing here, his heart were to stop naturally, per his living will, and overall condition, CPR is unlikely to be in line with his wishes or be medically therapeutic. He agreed to DNR, and if we are unable to extubate, withdrawal of ventilator and DNI.     He wants to give him a chance to liberate medically from the ventilator.

## 2017-03-23 NOTE — PROGRESS NOTE ADULT - ATTENDING COMMENTS
Thank you for the opportunity to assist with the care of this patient.   [facility] [phone]. COUNSELING:  Face to face meeting to discuss Advanced Care Planning - Time Spent 20 Minutes.  See goals of care note.      Thank you for the opportunity to assist with the care of this patient.   [facility] [phone].

## 2017-03-23 NOTE — GOALS OF CARE CONVERSATION - PERSONAL ADVANCE DIRECTIVE - TREATMENT GUIDELINE COMMENT
Comfort, elective removal of ventilator. Medications ordered.
-aggressively try to wean off the ventilator.

## 2017-03-23 NOTE — PROGRESS NOTE ADULT - PROBLEM SELECTOR PLAN 3
-poor chances for being able to wean off the ventilator
-very poor overall prognosis for being able to medically liberate from the ventilator, patient with clear wishes on his living will, no prolonged ventilation, but he did tolerate SBT a bit better today so we will give him a couple more days to see if he can wean
As above.
delerium most likely   CT scan negative
-currently remains intubated on Full vent support  -has not tolerated weaning trials CPAP trials, as he is noted to become tachypneic and tachycardic.   -will continue to titrate vent settings to maintain SaO2 88-92%  -PRN sedation  -daily SBT  -continue to update family with SBT results and discuss further goals of care
As above  -Isolation precautions
after arrest MS back to baseline, no hypothermia
continue current vent settings  repeat abg
delerium most likely but will r/o stroke
supportive care, contact precautions as per infection control
supportive care, contact precautions as per infection control
-very poor overall prognosis for being able to medically liberate from the ventilator, patient with clear wishes on his living will, no prolonged ventilation

## 2017-03-23 NOTE — GOALS OF CARE CONVERSATION - PERSONAL ADVANCE DIRECTIVE - NS PRO AD PATIENT TYPE ON CHART
Do Not Resuscitate (DNR)/Health Care Proxy (HCP)/Living Will
Do Not Resuscitate (DNR)/Living Will/Health Care Proxy (HCP)

## 2017-03-23 NOTE — PROGRESS NOTE ADULT - PROBLEM SELECTOR PROBLEM 1
Acute respiratory failure with hypoxia and hypercapnia
Cardiac arrest
Respiratory failure requiring intubation
Respiratory failure requiring intubation
Acute respiratory failure with hypoxia and hypercapnia
Cardiac arrest
Chronic obstructive pulmonary disease with acute exacerbation
Chronic obstructive pulmonary disease with acute exacerbation
Human metapneumovirus (hMPV) pneumonia
Respiratory failure requiring intubation

## 2017-03-23 NOTE — PROGRESS NOTE ADULT - PROBLEM SELECTOR PLAN 2
-brief cardiac arrest
-was a brief episode. patient is now a DNR
As above
As above cont nebs  Steroids
intermittently requiring norepinephrine; added midodrine 3/19
-supportive care, tylenol for fever, pain control
As above
As above cont nebs  Steroids
MV daily SBT
continue nebs and steroids
intermittently requiring norepinephrine hopes to keep off today 3/18
-brief, patient now DNR

## 2017-03-24 LAB
CULTURE RESULTS: SIGNIFICANT CHANGE UP
SPECIMEN SOURCE: SIGNIFICANT CHANGE UP

## 2017-06-16 ENCOUNTER — APPOINTMENT (OUTPATIENT)
Dept: PULMONOLOGY | Facility: CLINIC | Age: 72
End: 2017-06-16
